# Patient Record
Sex: FEMALE | Race: WHITE | NOT HISPANIC OR LATINO | Employment: STUDENT | URBAN - METROPOLITAN AREA
[De-identification: names, ages, dates, MRNs, and addresses within clinical notes are randomized per-mention and may not be internally consistent; named-entity substitution may affect disease eponyms.]

---

## 2017-05-19 ENCOUNTER — ALLSCRIPTS OFFICE VISIT (OUTPATIENT)
Dept: OTHER | Facility: OTHER | Age: 22
End: 2017-05-19

## 2017-10-19 ENCOUNTER — ALLSCRIPTS OFFICE VISIT (OUTPATIENT)
Dept: OTHER | Facility: OTHER | Age: 22
End: 2017-10-19

## 2017-10-20 NOTE — PROGRESS NOTES
Assessment  1  Neck muscle spasm (258 36) (F09 238)    Plan  Anxiety    · ALPRAZolam 0 25 MG Oral Tablet; TAKE 1 TABLET  AS NEEDED  Neck muscle spasm    · DrRx Flexeril 10 MG #30; 1 po qhs    Discussion/Summary    1  Left neck spasm-this is most likely acute muscular in nature without any other sign of neurological problem  Would recommend trial of over-the-counter ibuprofen 600 mg 3 times daily with food as necessary  Patient will also be given a short-term prescription of Flexeril 10 mg which can be taken in the evening as necessary  Patient was cautioned of drowsiness with this medication  Anxiety/panic attacks-patient has fairly significant symptoms however they are about 2-3 weeks apart on average  We discussed treatment options and and since they are relatively infrequent I would think it is appropriate to try a Xanax 0 25 mg when necessary  We discussed appropriate side effects and use of the medication  Patient was given 20 pills which I expect the last 6 months or more  Follow up as necessary  Chief Complaint  c/o neck pain and stiffness since Sunday  taking Tylenol  History of Present Illness  HPI: This is a 53-year-old female who presents to the office with concerns over a pain in her left neck which started 4 days ago upon waking in the morning  Symptoms have been persistent since and have not been alleviated with over-the-counter acetaminophen  She is having trouble with motion in her neck with left rotation and left flexion  It is also bothering her with extension of the cervical spine  She has not had any numbness or tingling in her shoulder or arm  She still has a full range of motion with her shoulder present  She has not had any redness or swelling of the skin  She would also like to discuss anxiety today as well  She does have a history of panic attacks and gets episodes about every 2-3 weeks   She was given Paxil in the past which she tried for this but felt very funny on the medication and did not continue it  Review of Systems    Constitutional: no fever,-- not feeling poorly,-- no chills-- and-- not feeling tired  ENT: no nosebleeds-- and-- no nasal discharge  Cardiovascular: no chest pain-- and-- no palpitations  Respiratory: no shortness of breath,-- no cough,-- no wheezing-- and-- no shortness of breath during exertion  Gastrointestinal: no abdominal pain,-- no nausea-- and-- no diarrhea  Musculoskeletal: no arthralgias-- and-- no myalgias  Neurological: no headache  Active Problems  1  Acute left otitis media (382 9) (H66 92)   2  Allergic rhinitis (477 9) (J30 9)   3  Anxiety (300 00) (F41 9)   4  Birth control counseling (V25 09) (Z30 09)   5  Eczema (692 9) (L30 9)   6  Eustachian tube dysfunction (381 81) (H69 80)   7  Insomnia (780 52) (G47 00)   8  Oral contraceptive use () (Z30 41)    Past Medical History  1  History of Acute allergic serous otitis media, bilateral (381 04) (H65 113)   2  Birth control counseling (V25 09) (Z30 09)   3  History of  0 (V49 89)   4  History of acute sinusitis (V12 69) (Z87 09)   5  History of otitis media (V12 49) (Z86 69)   6  History of self breast exam   7  History of serous otitis media (V12 49) (Z86 69)   8  History of Need for influenza vaccination (V04 81) (Z23)  Active Problems And Past Medical History Reviewed: The active problems and past medical history were reviewed and updated today  Family History  Brother    1  Family history of Asthma (V17 5)  Maternal Grandmother    2  Family history of Diabetes Mellitus (V18 0)   3  Family history of Hypertension (V17 49)  Maternal Grandfather    4  Family history of Prostate Cancer (V16 42)  Paternal Grandfather    5   Family history of Diabetes Mellitus (V18 0)    Social History   · Caffeine Use   · Denied: History of Drug Use   · Denied: History of    · Marital History - Single   · Never Drank Alcohol   · Oral contraceptive use () (Z30 41)   · Baptism Affiliation [employer]   · Uses Safety Equipment - Seatbelts    Surgical History  1  History of Oral Surgery Tooth Extraction    Current Meds   1  Gildess FE 1/20 1-20 MG-MCG TABS; Therapy: 06FBO3517 to (Evaluate:21Yuu4892) Recorded    The medication list was reviewed and updated today  Allergies  1  No Known Drug Allergies    Vitals   Recorded: 35FTU8367 02:10PM   Heart Rate 88   Systolic 543   Diastolic 72   Height 5 ft 8 in   Weight 170 lb 4 oz   BMI Calculated 25 89   BSA Calculated 1 91   BMI Percentile 83 %   2-20 Stature Percentile 92 %   2-20 Weight Percentile 91 %     Physical Exam    Constitutional   General appearance: No acute distress, well appearing and well nourished  Eyes   Conjunctiva and lids: No swelling, erythema or discharge  Pupils and irises: Equal, round and reactive to light  Ears, Nose, Mouth, and Throat   External inspection of ears and nose: Normal     Otoscopic examination: Tympanic membranes translucent with normal light reflex  Canals patent without erythema  Nasal mucosa, septum, and turbinates: Normal without edema or erythema  Oropharynx: Normal with no erythema, edema, exudate or lesions  Pulmonary   Respiratory effort: No increased work of breathing or signs of respiratory distress  Auscultation of lungs: Clear to auscultation  Cardiovascular   Auscultation of heart: Normal rate and rhythm, normal S1 and S2, without murmurs  Examination of extremities for edema and/or varicosities: Normal     Abdomen   Abdomen: Non-tender, no masses  Liver and spleen: No hepatomegaly or splenomegaly  Lymphatic   Palpation of lymph nodes in neck: No lymphadenopathy  Musculoskeletal   Gait and station: Normal     Digits and nails: Normal without clubbing or cyanosis  Inspection/palpation of joints, bones, and muscles: Abnormal  -- Decreased range of motion with the cervical spine up on extension, left rotation, and left flexion   There is no midline cervical tenderness present  Full range of motion with the left shoulder is present  Skin   Skin and subcutaneous tissue: Normal without rashes or lesions  Neurologic   Cranial nerves: Cranial nerves 2-12 intact  Reflexes: 2+ and symmetric  Sensation: No sensory loss      Psychiatric   Orientation to person, place, and time: Normal     Mood and affect: Normal          Signatures   Electronically signed by : Ermelinda Blancas [facility]; Oct 19 2017  2:31PM EST                       (Author)    Electronically signed by : Dread Miller DO; Oct 19 2017  2:33PM EST                       (Author)

## 2018-01-10 NOTE — MISCELLANEOUS
Message  Return to work or school:   Leela Biswas is under my professional care   She was seen in my office on 1/19/2016     She is able to return to school on 1/19/2016          Signatures   Electronically signed by : Cristina Gutiérrez, ; Jan 19 2016  2:07PM EST                       (Author)

## 2018-01-13 NOTE — PROGRESS NOTES
Assessment    1  Eustachian tube dysfunction (381 81) (H69 80)   2  Otitis media (382 9) (H66 90)   3  Allergic rhinitis (477 9) (J30 9)   4  Oral contraceptive use (V25 41) (Z30 41)   5  Anxiety (300 00) (F41 9)    Plan  Allergic rhinitis    · Ipratropium Bromide 0 03 % Nasal Solution  Allergic rhinitis, Eustachian tube dysfunction, Otitis media    · Follow-up PRN Evaluation and Treatment  Follow-up  Status: Complete  Done:  47DSJ2914 02:03PM  Anxiety, SocHx: Oral contraceptive use    · Continue with our present treatment plan ; Status:Complete;   Done: 37ZAN5385 02:03PM    Discussion/Summary    #1  Otitis media, Z-Jovani was prescribed  #2 per allergic rhinitis, continue present therapy  #3  Eustachian tube dysfunction  Prednisone was ordered  #4  All contraceptive use  Patient instructed to second form of birth control for the entire cycle  Interaction of antibiotics her birth control pills was discussed with the patient  Patient verbalized understanding  #5 anxiety, stable continue present therapy  #6  Return to office in one week if still symptoms  Possible side effects of new medications were reviewed with the patient/guardian today  The treatment plan was reviewed with the patient/guardian  The patient/guardian understands and agrees with the treatment plan      Chief Complaint  pt c/o painful ears, sore throat, dry & productive cough, nasal congestion x 3 days  Pt taking Flonase, Advil and Mucinex D with little relief  ak      History of Present Illness  HPI: History as above  Clear sputum no fever chills night sweats  Patient feels anxiety and panic disorders stable on the new dose of Paxil      Review of Systems    Constitutional: as noted in HPI  Eyes: No complaints of eye pain, no discharge, no eyesight problems, eyes do not itch, no red or dry eyes  ENT: as noted in HPI  Cardiovascular: No complaints of chest pain, no palpitations, normal heart rate, no lower extremity edema     Respiratory: as noted in HPI  Gastrointestinal: No complaints of abdominal pain, no nausea or vomiting, no constipation, no diarrhea or bloody stools  Genitourinary: No complaints of incontinence, no pelvic pain, no dysuria or dysmenorrhea, no abnormal vaginal bleeding or vaginal discharge  Musculoskeletal: No complaints of limb swelling or limb pain, no myalgias, no joint swelling or joint stiffness  Integumentary: No complaints of skin rash, no skin lesions or wounds, no itching, no breast pain, no breast lump  Neurological: No complaints of headache, no numbness or tingling, no confusion, no dizziness, no limb weakness, no convulsions or fainting, no difficulty walking  Psychiatric: as noted in HPI  Endocrine: No complaints of feeling weak, no muscle weakness, no deepening of voice, no hot flashes or proptosis  Hematologic/Lymphatic: No complaints of swollen glands, no neck swollen glands, does not bleed or bruise easily  ROS reported by the patient  Active Problems    1  Allergic rhinitis (477 9) (J30 9)   2  Anxiety (300 00) (F41 9)   3  Birth control counseling (V2 09) (Z30 9)   4  Eczema (692 9) (L30 9)   5  History of self breast exam   6  Insomnia (780 52) (G47 00)   7  Need for influenza vaccination (V04 81) (Z23)   8  Oral contraceptive use (V25 41) (Z30 41)    Past Medical History    1  Birth control counseling ( 09) (Z30 9)   2  History of  0 (V61 8) (Z63 8)    Family History    1  Family history of Asthma (V17 5)    2  Family history of Diabetes Mellitus (V18 0)   3  Family history of Hypertension (V17 49)    4  Family history of Prostate Cancer (V16 42)    5  Family history of Diabetes Mellitus (V18 0)  Family History Reviewed: The family history was reviewed and updated today         Social History    · Caffeine Use   · Denied: History of Drug Use   · Denied: History of    · Marital History - Single   · Never A Smoker   · Never Drank Alcohol   · Oral contraceptive use (V25 41) (Z30 41)   · Restorationism Affiliation [employer]   · Uses Safety Equipment - Seatbelts  The social history was reviewed and updated today  Surgical History    1  History of Oral Surgery Tooth Extraction    Current Meds   1  Daily Value Multivitamin Oral Tablet; Take 1 tablet daily Recorded   2  Flonase 50 MCG/ACT SUSP; USE AS DIRECTED Recorded   3  Gildess FE 1/20 1-20 MG-MCG Oral Tablet; Therapy: 91HBF0442 to (Evaluate:94Apy1497) Recorded   4  Ipratropium Bromide 0 03 % Nasal Solution; USE 2 SPRAYS IN EACH NOSTRIL 3   TIMES DAILY AS NEEDED; Therapy: 23UVT6649 to (Last Rx:17Aug2015)  Requested for: 22Dws5585 Ordered   5  LORazepam 0 5 MG Oral Tablet; take 1 tablet daily as needed; Therapy: 85KQY2970 to (Last Rx:03Jun2014) Ordered   6  Montelukast Sodium 10 MG Oral Tablet; Take 1 tablet daily; Therapy: 66HAS4446 to (Last Rx:09Mar2015)  Requested for: 42TFS8766 Ordered   7  PARoxetine HCl - 30 MG Oral Tablet; TAKE 1 TABLET AT BEDTIME; Therapy: 14OPJ7217 to (Last Rx:06Jan2016)  Requested for: 61RJM7187 Ordered   8  Vitamin C 500 MG Oral Capsule; Therapy: 79LBI9291 to Recorded    Allergies    1  No Known Drug Allergies    Vitals   Recorded: 03UPI6964 01:49PM   Temperature 98 5 F   Heart Rate 96   Systolic 610   Diastolic 70   Height 5 ft 8 in   Weight 175 lb    BMI Calculated 26 61   BSA Calculated 1 94     Physical Exam    Constitutional - General appearance: No acute distress, well appearing and well nourished  Head and Face - Palpation of the face and sinuses: Normal, no sinus tenderness  Eyes - Conjunctiva and lids: No injection, edema or discharge  Ears, Nose, Mouth, and Throat - External inspection of ears and nose: Normal without deformities or discharge  Otoscopic examination: Abnormal  Both tympanic membranes are dull and injected  Nasal mucosa, septum, and turbinates: Abnormal  Positive allergic turbinates   Oropharynx: Abnormal  Clear postnasal drip minimal injection negative exudate  Neck - Neck: Supple, symmetric, no masses  Pulmonary - Auscultation of lungs: Clear bilaterally  Lymphatic - Palpation of lymph nodes in neck: Abnormal  Tender shotty anterior adenopathy  Musculoskeletal - Gait and station: Normal gait  Skin - Warm and dry  Neurologic - Negative gross focal deficit     Psychiatric - Mood and affect: Normal       Future Appointments    Date/Time Provider Specialty Site   03/09/2016 04:15 PM Aarti Mas DO [facility]     Signatures   Electronically signed by : Paul Sorensen DO; Jan 19 2016  2:04PM EST                       (Author)

## 2018-01-14 VITALS
WEIGHT: 170.25 LBS | HEART RATE: 88 BPM | DIASTOLIC BLOOD PRESSURE: 72 MMHG | HEIGHT: 68 IN | SYSTOLIC BLOOD PRESSURE: 100 MMHG | BODY MASS INDEX: 25.8 KG/M2

## 2018-01-14 VITALS
HEIGHT: 68 IN | TEMPERATURE: 98.1 F | WEIGHT: 167 LBS | HEART RATE: 100 BPM | BODY MASS INDEX: 25.31 KG/M2 | SYSTOLIC BLOOD PRESSURE: 102 MMHG | DIASTOLIC BLOOD PRESSURE: 68 MMHG

## 2018-01-16 NOTE — MISCELLANEOUS
Message  Return to work or school:   Birgit Solitario is under my professional care   She was seen in my office on 1/19/2016     She is able to return to school on 1/20/2016          Signatures   Electronically signed by : Cody Gabriel, ; Jan 19 2016  2:07PM EST                       (Author)

## 2018-11-19 RX ORDER — NORETHINDRONE ACETATE AND ETHINYL ESTRADIOL 1MG-20(21)
KIT ORAL
COMMUNITY
Start: 2015-06-04 | End: 2020-12-04 | Stop reason: ALTCHOICE

## 2018-11-19 RX ORDER — ALPRAZOLAM 0.25 MG/1
1 TABLET ORAL
COMMUNITY
Start: 2017-10-19 | End: 2020-08-17 | Stop reason: SDUPTHER

## 2018-11-20 ENCOUNTER — TELEPHONE (OUTPATIENT)
Dept: SURGERY | Facility: CLINIC | Age: 23
End: 2018-11-20

## 2018-11-20 ENCOUNTER — OFFICE VISIT (OUTPATIENT)
Dept: SURGERY | Facility: CLINIC | Age: 23
End: 2018-11-20
Payer: COMMERCIAL

## 2018-11-20 ENCOUNTER — HOSPITAL ENCOUNTER (OUTPATIENT)
Dept: ULTRASOUND IMAGING | Facility: HOSPITAL | Age: 23
Discharge: HOME | End: 2018-11-20
Payer: COMMERCIAL

## 2018-11-20 ENCOUNTER — OFFICE VISIT (OUTPATIENT)
Dept: FAMILY MEDICINE CLINIC | Facility: CLINIC | Age: 23
End: 2018-11-20
Payer: COMMERCIAL

## 2018-11-20 VITALS
HEIGHT: 68 IN | WEIGHT: 159.8 LBS | HEART RATE: 84 BPM | BODY MASS INDEX: 24.22 KG/M2 | SYSTOLIC BLOOD PRESSURE: 110 MMHG | DIASTOLIC BLOOD PRESSURE: 72 MMHG

## 2018-11-20 VITALS
HEIGHT: 68 IN | BODY MASS INDEX: 24.1 KG/M2 | DIASTOLIC BLOOD PRESSURE: 80 MMHG | SYSTOLIC BLOOD PRESSURE: 110 MMHG | HEART RATE: 84 BPM | WEIGHT: 159 LBS

## 2018-11-20 DIAGNOSIS — K62.5 RECTAL BLEED: ICD-10-CM

## 2018-11-20 DIAGNOSIS — R19.5 HEMATEST POSITIVE STOOLS: ICD-10-CM

## 2018-11-20 DIAGNOSIS — K62.5 RECTAL BLEED: Primary | ICD-10-CM

## 2018-11-20 DIAGNOSIS — K59.00 CONSTIPATION, UNSPECIFIED CONSTIPATION TYPE: ICD-10-CM

## 2018-11-20 DIAGNOSIS — R10.9 ABDOMINAL PAIN, UNSPECIFIED ABDOMINAL LOCATION: ICD-10-CM

## 2018-11-20 PROCEDURE — 99214 OFFICE O/P EST MOD 30 MIN: CPT | Performed by: FAMILY MEDICINE

## 2018-11-20 PROCEDURE — 99243 OFF/OP CNSLTJ NEW/EST LOW 30: CPT | Performed by: PHYSICIAN ASSISTANT

## 2018-11-20 PROCEDURE — 76700 US EXAM ABDOM COMPLETE: CPT

## 2018-11-20 NOTE — PROGRESS NOTES
Assessment/Plan:  1  Rectal bleed 2  Hemato test positive stool 3  Constipation 4  Abdominal pain  Blood work is ordered, ultrasound of abdomen is ordered referred to General surgery, Dr James Luna for further evaluation treatment  If rectal bleed worsens or abdominal pain worse report to [facility]          Rectal bleed  Blood work is ordered refer to Dr Obdulia Moura       Diagnoses and all orders for this visit:    Rectal bleed  -     CBC; Future  -     Comprehensive metabolic panel; Future  -     TSH, 3rd generation with Free T4 reflex; Future  -     Urinalysis with reflex to microscopic; Future  -     Pregnancy Test (HCG Qualitative); Future  -     US abdomen complete; Future  -     Ambulatory referral to General Surgery; Future    Hematest positive stools  -     CBC; Future  -     Comprehensive metabolic panel; Future  -     TSH, 3rd generation with Free T4 reflex; Future  -     Urinalysis with reflex to microscopic; Future  -     Pregnancy Test (HCG Qualitative); Future  -     US abdomen complete; Future  -     Ambulatory referral to General Surgery; Future    Abdominal pain, unspecified abdominal location  -     CBC; Future  -     Comprehensive metabolic panel; Future  -     TSH, 3rd generation with Free T4 reflex; Future  -     Urinalysis with reflex to microscopic; Future  -     Pregnancy Test (HCG Qualitative); Future  -     US abdomen complete; Future  -     Ambulatory referral to General Surgery; Future    Constipation, unspecified constipation type  -     CBC; Future  -     Comprehensive metabolic panel; Future  -     TSH, 3rd generation with Free T4 reflex; Future  -     Urinalysis with reflex to microscopic; Future  -     Pregnancy Test (HCG Qualitative); Future  -     US abdomen complete; Future  -     Ambulatory referral to General Surgery; Future          Subjective: Pt c/o bright red blood with bowel movement   She states she has had some red blood when wiping but this colored the toilet water  She has a family history of Colon polyps  kw     Patient ID: Charlotte Calderon is a 23 y o  female  For the past couple days patient has bright red blood doing her bowel movements patient does feel some mild abdominal pain and constipation  Patient denies any melena change in stool  The following portions of the patient's history were reviewed and updated as appropriate: allergies, current medications, past family history, past medical history, past social history, past surgical history and problem list     Review of Systems   Constitutional: Negative  HENT: Negative  Eyes: Negative  Respiratory: Negative  Cardiovascular: Negative  Gastrointestinal:        HPI   Endocrine: Negative  Genitourinary: Negative  Musculoskeletal: Negative  Skin: Negative  Allergic/Immunologic: Negative  Neurological: Negative  Hematological: Negative  Psychiatric/Behavioral: Negative  Objective:      /72 (BP Location: Left arm)   Pulse 84   Ht 5' 8" (1 727 m)   Wt 72 5 kg (159 lb 12 8 oz)   BMI 24 30 kg/m²          Physical Exam   Constitutional: She is oriented to person, place, and time  She appears well-developed and well-nourished  HENT:   Head: Normocephalic and atraumatic  Mouth/Throat: Oropharynx is clear and moist    Eyes: Pupils are equal, round, and reactive to light  Conjunctivae and EOM are normal  No scleral icterus  Neck: Neck supple  No thyromegaly present  Cardiovascular: Normal rate, regular rhythm and normal heart sounds  Pulmonary/Chest: Effort normal and breath sounds normal    Abdominal: Soft  Bowel sounds are normal  She exhibits no distension and no mass  There is no tenderness  There is no rebound and no guarding  Genitourinary: Rectal exam shows guaiac positive stool     Genitourinary Comments: Rectal exam shows no mass or abnormalities stool was brown but hemato test positive   Musculoskeletal: She exhibits no edema  Lymphadenopathy:     She has no cervical adenopathy  Neurological: She is alert and oriented to person, place, and time  Skin: Skin is warm and dry  Psychiatric: She has a normal mood and affect

## 2018-11-20 NOTE — PATIENT INSTRUCTIONS
Complete blood work complete ultrasound patient follow up with Dr Marito Jimenez, surgery    If abdominal pain worsens rectal bleed worsens patient report to [facility] [place] [facility]

## 2018-11-20 NOTE — PROGRESS NOTES
Assessment/Plan:   Charlotte Calderon is a 23 y  o female who is here for The encounter diagnosis was Rectal bleed  Patient with left lower quadrant abdominal pain for 3-4 days  Denies nausea or vomiting  Patient with bright red blood with bowel movement yesterday and clots in water  Patient admits to intermittent blood on toilet paper over the past year  Denies any rectal pain  Patient admits to loose stools 5 times a day  Family history of colon polyps  Plan:  Plan for colonoscopy         Preoperative Clearance: None        - Patient has been instructed to avoid aspirin containing medications or non-steroidal anti-inflammatory drugs for the week preceding surgery  ______________________________________________________  CC:Consult (blood in stool)    HPI:  Charlotte Calderon is a 23 y  o female who was referred for evaluation of Consult (blood in stool)    Currently bright red blood per rectum and in bowel movements x1 day  Associated with left lower quadrant abdominal pain  Patient admits to intermittent blood on toilet paper over the past year  Patient denies any change in stool caliber or consistency  Patient moves her bowels about 4-5 times daily  Denies fevers, chills, shortness of breath, chest pain or palpitation  Family history of colon polyps      ROS:  General ROS: negative  negative for - chills, fatigue, fever or night sweats, weight loss  Respiratory ROS: no cough, shortness of breath, or wheezing  Cardiovascular ROS: no chest pain or dyspnea on exertion  Genito-Urinary ROS: no dysuria, trouble voiding, or hematuria  Musculoskeletal ROS: negative for - gait disturbance, joint pain or muscle pain  Neurological ROS: no TIA or stroke symptoms  Gastrointestinal: see HPI  No abdominal pain, melena, BRB unless specified in HPI  Skin ROS: no new rashes or lesions   Lymphatic ROS: no new adenopathy noted by pt     GYN ROS: see HPI, no new GYN history or bleeding noted  Psy ROS: no new mental or behavioral disturbances       Patient Active Problem List   Diagnosis    Allergic rhinitis    Anxiety    Eczema    Eustachian tube dysfunction    Insomnia    Rectal bleed         Allergies:  Metronidazole      Current Outpatient Prescriptions:     ALPRAZolam (XANAX) 0 25 mg tablet, Take 1 tablet by mouth, Disp: , Rfl:     norethindrone-ethinyl estradiol (GILDESS FE 1/20) 1-20 MG-MCG per tablet, Take by mouth, Disp: , Rfl:     No past medical history on file  Past Surgical History:   Procedure Laterality Date    TOOTH EXTRACTION         Family History   Problem Relation Age of Onset    Asthma Brother     Diabetes Maternal Grandmother     Hypertension Maternal Grandmother     Prostate cancer Maternal Grandfather     Diabetes Paternal Grandfather         reports that she has never smoked  She has never used smokeless tobacco  She reports that she does not drink alcohol or use drugs  Labs:   Lab Results   Component Value Date    WBC 5 19 11/25/2014    HGB 13 4 11/25/2014    HCT 41 7 11/25/2014    MCV 93 11/25/2014     11/25/2014     Lab Results   Component Value Date     11/25/2014    K 3 9 11/25/2014     11/25/2014    CO2 29 11/25/2014    ANIONGAP 6 11/25/2014    BUN 12 11/25/2014    CREATININE 0 63 11/25/2014    GLUCOSE 79 11/25/2014    CALCIUM 9 5 11/25/2014    AST 12 11/25/2014    ALT 12 11/25/2014    ALKPHOS 95 11/25/2014    PROT 7 7 11/25/2014    BILITOT 0 4 11/25/2014         Imaging: No new pertinent imaging studies           PHYSICAL EXAM        PHYSICAL EXAM  General Appearance:    Alert, cooperative, no distress, healthy   Head:    Normocephalic without obvious abnormality   Eyes:    PERRL, conjunctiva/corneas clear, EOM's intact        Neck:   Supple, no adenopathy, no JVD   Back:     Symmetric, no spinal or CVA tenderness   Lungs:     Clear to auscultation bilaterally, no wheezing or rhonchi   Heart:    Regular rate and rhythm, S1 and S2 normal, no murmur   Abdomen: Soft, mild LLQ tenderness to deep palpitation  + BS  Rectum without external hemorrhoids   Extremities:   Extremities normal  No clubbing, cyanosis or edema   Psych:   Normal Affect   Neurologic:   CNII-XII intact  Strength symmetric, speech intact                                           Some portions of this record may have been generated with voice recognition software  There may be translation, syntax,  or grammatical errors  Occasional wrong word or "sound-a-like" substitutions may have occurred due to the inherent limitations of the voice recognition software  Read the chart carefully and recognize, using context, where substitutions may have occurred  If you have any questions, please contact the dictating provider for clarification or correction, as needed  This encounter has been coded by a non-certified coder         Kelly Torres PA-C    Date: 11/20/2018 Time: 10:41 AM

## 2018-11-20 NOTE — LETTER
November 20, 2018     DO Ruba Adhikari [address]  [address] [place]    Patient: Lorrie Busby   YOB: 1995   Date of Visit: 11/20/2018       Dear Dr Denise Box: Thank you for referring Lorrie Busby to me for evaluation  Below are my notes for this consultation  If you have questions, please do not hesitate to call me  I look forward to following your patient along with you  Sincerely,        Perfecto Milton PA-C        CC: No Recipients  Gabriel Sofia  11/20/2018 10:45 AM  Sign at close encounter  Assessment/Plan:   Lorrie Busby is a 23 y  o female who is here for The encounter diagnosis was Rectal bleed  Patient with left lower quadrant abdominal pain for 3-4 days  Denies nausea or vomiting  Patient with bright red blood with bowel movement yesterday and clots in water  Patient admits to intermittent blood on toilet paper over the past year  Denies any rectal pain  Patient admits to loose stools 5 times a day  Family history of colon polyps  Plan:  Plan for colonoscopy         Preoperative Clearance: None        - Patient has been instructed to avoid aspirin containing medications or non-steroidal anti-inflammatory drugs for the week preceding surgery  ______________________________________________________  CC:Consult (blood in stool)    HPI:  Lorrie Busby is a 23 y  o female who was referred for evaluation of Consult (blood in stool)    Currently bright red blood per rectum and in bowel movements x1 day  Associated with left lower quadrant abdominal pain  Patient admits to intermittent blood on toilet paper over the past year  Patient denies any change in stool caliber or consistency  Patient moves her bowels about 4-5 times daily  Denies fevers, chills, shortness of breath, chest pain or palpitation      Family history of colon polyps      ROS:  General ROS: negative  negative for - chills, fatigue, fever or night sweats, weight loss  Respiratory ROS: no cough, shortness of breath, or wheezing  Cardiovascular ROS: no chest pain or dyspnea on exertion  Genito-Urinary ROS: no dysuria, trouble voiding, or hematuria  Musculoskeletal ROS: negative for - gait disturbance, joint pain or muscle pain  Neurological ROS: no TIA or stroke symptoms  Gastrointestinal: see HPI  No abdominal pain, melena, BRB unless specified in HPI  Skin ROS: no new rashes or lesions   Lymphatic ROS: no new adenopathy noted by pt  GYN ROS: see HPI, no new GYN history or bleeding noted  Psy ROS: no new mental or behavioral disturbances       Patient Active Problem List   Diagnosis    Allergic rhinitis    Anxiety    Eczema    Eustachian tube dysfunction    Insomnia    Rectal bleed         Allergies:  Metronidazole      Current Outpatient Prescriptions:     ALPRAZolam (XANAX) 0 25 mg tablet, Take 1 tablet by mouth, Disp: , Rfl:     norethindrone-ethinyl estradiol (GILDESS FE 1/20) 1-20 MG-MCG per tablet, Take by mouth, Disp: , Rfl:     No past medical history on file  Past Surgical History:   Procedure Laterality Date    TOOTH EXTRACTION         Family History   Problem Relation Age of Onset    Asthma Brother     Diabetes Maternal Grandmother     Hypertension Maternal Grandmother     Prostate cancer Maternal Grandfather     Diabetes Paternal Grandfather         reports that she has never smoked  She has never used smokeless tobacco  She reports that she does not drink alcohol or use drugs      Labs:   Lab Results   Component Value Date    WBC 5 19 11/25/2014    HGB 13 4 11/25/2014    HCT 41 7 11/25/2014    MCV 93 11/25/2014     11/25/2014     Lab Results   Component Value Date     11/25/2014    K 3 9 11/25/2014     11/25/2014    CO2 29 11/25/2014    ANIONGAP 6 11/25/2014    BUN 12 11/25/2014    CREATININE 0 63 11/25/2014    GLUCOSE 79 11/25/2014    CALCIUM 9 5 11/25/2014    AST 12 11/25/2014    ALT 12 11/25/2014    ALKPHOS 95 11/25/2014 PROT 7 7 11/25/2014    BILITOT 0 4 11/25/2014         Imaging: No new pertinent imaging studies  PHYSICAL EXAM        PHYSICAL EXAM  General Appearance:    Alert, cooperative, no distress, healthy   Head:    Normocephalic without obvious abnormality   Eyes:    PERRL, conjunctiva/corneas clear, EOM's intact        Neck:   Supple, no adenopathy, no JVD   Back:     Symmetric, no spinal or CVA tenderness   Lungs:     Clear to auscultation bilaterally, no wheezing or rhonchi   Heart:    Regular rate and rhythm, S1 and S2 normal, no murmur   Abdomen:     Soft, mild LLQ tenderness to deep palpitation  + BS  Rectum without external hemorrhoids   Extremities:   Extremities normal  No clubbing, cyanosis or edema   Psych:   Normal Affect   Neurologic:   CNII-XII intact  Strength symmetric, speech intact                                           Some portions of this record may have been generated with voice recognition software  There may be translation, syntax,  or grammatical errors  Occasional wrong word or "sound-a-like" substitutions may have occurred due to the inherent limitations of the voice recognition software  Read the chart carefully and recognize, using context, where substitutions may have occurred  If you have any questions, please contact the dictating provider for clarification or correction, as needed  This encounter has been coded by a non-certified coder         Maximilian Elaine PA-C    Date: 11/20/2018 Time: 10:41 AM

## 2018-11-20 NOTE — TELEPHONE ENCOUNTER
Patient is here for her same day appointment  Can you please generate [facility] referral? (none available in [place])    Thank you     Any questions/issues, please call [phone]

## 2018-11-21 PROBLEM — K62.5 RECTAL BLEEDING: Status: ACTIVE | Noted: 2018-11-21

## 2018-11-21 LAB
ALBUMIN SERPL-MCNC: 4.5 G/DL (ref 3.6–5.1)
ALBUMIN/GLOB SERPL: 2 (CALC) (ref 1–2.5)
ALP SERPL-CCNC: 55 U/L (ref 33–115)
ALT SERPL-CCNC: 6 U/L (ref 6–29)
APPEARANCE UR: ABNORMAL
AST SERPL-CCNC: 11 U/L (ref 10–30)
B-HCG SERPL QL: NEGATIVE
BASOPHILS # BLD AUTO: 41 CELLS/UL (ref 0–200)
BASOPHILS NFR BLD AUTO: 0.7 %
BILIRUB SERPL-MCNC: 0.5 MG/DL (ref 0.2–1.2)
BILIRUB UR QL STRIP: NEGATIVE
BUN SERPL-MCNC: 17 MG/DL (ref 7–25)
BUN/CREAT SERPL: NORMAL (CALC) (ref 6–22)
CALCIUM SERPL-MCNC: 9.5 MG/DL (ref 8.6–10.2)
CHLORIDE SERPL-SCNC: 108 MMOL/L (ref 98–110)
CO2 SERPL-SCNC: 28 MMOL/L (ref 20–32)
COLOR UR: YELLOW
CREAT SERPL-MCNC: 0.82 MG/DL (ref 0.5–1.1)
EOSINOPHIL # BLD AUTO: 110 CELLS/UL (ref 15–500)
EOSINOPHIL NFR BLD AUTO: 1.9 %
ERYTHROCYTE [DISTWIDTH] IN BLOOD BY AUTOMATED COUNT: 11.9 % (ref 11–15)
GLOBULIN SER CALC-MCNC: 2.2 G/DL (CALC) (ref 1.9–3.7)
GLUCOSE SERPL-MCNC: 85 MG/DL (ref 65–99)
GLUCOSE UR QL STRIP: NEGATIVE
HCT VFR BLD AUTO: 39.8 % (ref 35–45)
HGB BLD-MCNC: 13.5 G/DL (ref 11.7–15.5)
HGB UR QL STRIP: NEGATIVE
KETONES UR QL STRIP: NEGATIVE
LEUKOCYTE ESTERASE UR QL STRIP: NEGATIVE
LYMPHOCYTES # BLD AUTO: 1531 CELLS/UL (ref 850–3900)
LYMPHOCYTES NFR BLD AUTO: 26.4 %
MCH RBC QN AUTO: 31 PG (ref 27–33)
MCHC RBC AUTO-ENTMCNC: 33.9 G/DL (ref 32–36)
MCV RBC AUTO: 91.5 FL (ref 80–100)
MONOCYTES # BLD AUTO: 423 CELLS/UL (ref 200–950)
MONOCYTES NFR BLD AUTO: 7.3 %
NEUTROPHILS # BLD AUTO: 3695 CELLS/UL (ref 1500–7800)
NEUTROPHILS NFR BLD AUTO: 63.7 %
NITRITE UR QL STRIP: NEGATIVE
PH UR STRIP: ABNORMAL [PH] (ref 5–8)
PLATELET # BLD AUTO: 250 THOUSAND/UL (ref 140–400)
PMV BLD REES-ECKER: 11.1 FL (ref 7.5–12.5)
POTASSIUM SERPL-SCNC: 4.1 MMOL/L (ref 3.5–5.3)
PROT SERPL-MCNC: 6.7 G/DL (ref 6.1–8.1)
PROT UR QL STRIP: NEGATIVE
RBC # BLD AUTO: 4.35 MILLION/UL (ref 3.8–5.1)
SL AMB EGFR AFRICAN AMERICAN: 119 ML/MIN/1.73M2
SL AMB EGFR NON AFRICAN AMERICAN: 103 ML/MIN/1.73M2
SODIUM SERPL-SCNC: 143 MMOL/L (ref 135–146)
SP GR UR STRIP: 1.03 (ref 1–1.03)
TSH SERPL-ACNC: 1.24 MIU/L
WBC # BLD AUTO: 5.8 THOUSAND/UL (ref 3.8–10.8)

## 2018-11-26 ENCOUNTER — OFFICE VISIT (OUTPATIENT)
Dept: FAMILY MEDICINE CLINIC | Facility: CLINIC | Age: 23
End: 2018-11-26
Payer: COMMERCIAL

## 2018-11-26 VITALS
HEART RATE: 72 BPM | BODY MASS INDEX: 23.7 KG/M2 | WEIGHT: 160 LBS | TEMPERATURE: 98.3 F | HEIGHT: 69 IN | DIASTOLIC BLOOD PRESSURE: 60 MMHG | SYSTOLIC BLOOD PRESSURE: 122 MMHG

## 2018-11-26 DIAGNOSIS — H65.91 OME (OTITIS MEDIA WITH EFFUSION), RIGHT: Primary | ICD-10-CM

## 2018-11-26 PROCEDURE — 3008F BODY MASS INDEX DOCD: CPT | Performed by: PHYSICIAN ASSISTANT

## 2018-11-26 PROCEDURE — 99214 OFFICE O/P EST MOD 30 MIN: CPT | Performed by: PHYSICIAN ASSISTANT

## 2018-11-26 RX ORDER — SULFAMETHOXAZOLE AND TRIMETHOPRIM 800; 160 MG/1; MG/1
1 TABLET ORAL EVERY 12 HOURS SCHEDULED
Qty: 20 TABLET | Refills: 0 | Status: SHIPPED | OUTPATIENT
Start: 2018-11-26 | End: 2018-12-06

## 2018-11-26 NOTE — PROGRESS NOTES
Assessment/Plan:    OME (otitis media with effusion), right  Antibiotic as directed  May use Mucinex D or generic Sudefed  Increase fluids  Diagnoses and all orders for this visit:    OME (otitis media with effusion), right  -     antipyrine-benzocaine (AURODEX) otic solution; Administer 4 drops to the right ear every 2 (two) hours as needed for ear pain  -     sulfamethoxazole-trimethoprim (BACTRIM DS) 800-160 mg per tablet; Take 1 tablet by mouth every 12 (twelve) hours for 10 days          Subjective:   CC: c/o congestion and right ear pain  kck     Patient ID: Rene Bobby is a 23 y o  female  Four days ago started with head congestion and developed right ear pain  Left ear pain feels okay patient has used Claritin without D without relief  Having pain sharp shooting intermittent right ear  History of ear infections  No sick contacts  No fevers  No nausea vomiting or diarrhea  The following portions of the patient's history were reviewed and updated as appropriate: allergies, current medications, past family history, past medical history, past social history, past surgical history and problem list     Review of Systems   Constitutional: Negative  HENT: Positive for congestion and ear pain  Eyes: Negative  Respiratory: Negative  Cardiovascular: Negative  Gastrointestinal: Negative  Endocrine: Negative  Genitourinary: Negative  Musculoskeletal: Negative  Skin: Negative  Allergic/Immunologic: Negative  Neurological: Negative  Hematological: Negative  Psychiatric/Behavioral: Negative  Objective:      Vitals:    11/26/18 1054   BP: 122/60   BP Location: Left arm   Patient Position: Sitting   Pulse: 72   Temp: 98 3 °F (36 8 °C)   TempSrc: Oral   Weight: 72 6 kg (160 lb)   Height: 5' 9" (1 753 m)            Physical Exam   Constitutional: She is oriented to person, place, and time  She appears well-developed and well-nourished  No distress  HENT:   Head: Normocephalic and atraumatic  Right Ear: Ear canal normal  There is swelling and tenderness  Tympanic membrane is erythematous and bulging  Decreased hearing is noted  Left Ear: Hearing, tympanic membrane, external ear and ear canal normal    Nose: Nose normal    Mouth/Throat: Posterior oropharyngeal edema and posterior oropharyngeal erythema present  Eyes: Conjunctivae are normal  Right eye exhibits no discharge  Left eye exhibits no discharge  Neck: Neck supple  Carotid bruit is not present  Cardiovascular: Normal rate, regular rhythm and normal heart sounds  Exam reveals no gallop and no friction rub  No murmur heard  Pulmonary/Chest: Effort normal and breath sounds normal  No respiratory distress  She has no wheezes  She has no rales  Lymphadenopathy:     She has cervical adenopathy  Right cervical: Superficial cervical adenopathy present  Neurological: She is alert and oriented to person, place, and time  Skin: Skin is warm and dry  She is not diaphoretic  Psychiatric: She has a normal mood and affect  Judgment normal    Nursing note and vitals reviewed

## 2018-11-26 NOTE — PATIENT INSTRUCTIONS
Problem List Items Addressed This Visit        Nervous and Auditory    OME (otitis media with effusion), right - Primary     Antibiotic as directed  May use Mucinex D or generic Sudefed  Increase fluids            Relevant Medications    antipyrine-benzocaine (AURODEX) otic solution    sulfamethoxazole-trimethoprim (BACTRIM DS) 800-160 mg per tablet

## 2018-12-03 ENCOUNTER — ANESTHESIA EVENT (OUTPATIENT)
Dept: GASTROENTEROLOGY | Facility: HOSPITAL | Age: 23
End: 2018-12-03
Payer: COMMERCIAL

## 2018-12-04 ENCOUNTER — HOSPITAL ENCOUNTER (OUTPATIENT)
Facility: HOSPITAL | Age: 23
Setting detail: OUTPATIENT SURGERY
Discharge: HOME | End: 2018-12-04
Attending: SURGERY | Admitting: SURGERY
Payer: COMMERCIAL

## 2018-12-04 ENCOUNTER — ANESTHESIA (OUTPATIENT)
Dept: GASTROENTEROLOGY | Facility: HOSPITAL | Age: 23
End: 2018-12-04
Payer: COMMERCIAL

## 2018-12-04 VITALS
RESPIRATION RATE: 14 BRPM | TEMPERATURE: 98.9 F | HEIGHT: 69 IN | DIASTOLIC BLOOD PRESSURE: 70 MMHG | BODY MASS INDEX: 23.55 KG/M2 | HEART RATE: 76 BPM | WEIGHT: 159 LBS | OXYGEN SATURATION: 100 % | SYSTOLIC BLOOD PRESSURE: 107 MMHG

## 2018-12-04 DIAGNOSIS — K62.5 RECTAL BLEEDING: ICD-10-CM

## 2018-12-04 LAB — EXT PREGNANCY TEST URINE: NEGATIVE

## 2018-12-04 PROCEDURE — 45380 COLONOSCOPY AND BIOPSY: CPT | Performed by: SURGERY

## 2018-12-04 PROCEDURE — 81025 URINE PREGNANCY TEST: CPT | Performed by: ANESTHESIOLOGY

## 2018-12-04 PROCEDURE — 88305 TISSUE EXAM BY PATHOLOGIST: CPT | Performed by: PATHOLOGY

## 2018-12-04 RX ORDER — PROPOFOL 10 MG/ML
INJECTION, EMULSION INTRAVENOUS AS NEEDED
Status: DISCONTINUED | OUTPATIENT
Start: 2018-12-04 | End: 2018-12-04 | Stop reason: SURG

## 2018-12-04 RX ORDER — SODIUM CHLORIDE 9 MG/ML
125 INJECTION, SOLUTION INTRAVENOUS CONTINUOUS
Status: DISCONTINUED | OUTPATIENT
Start: 2018-12-04 | End: 2018-12-04 | Stop reason: HOSPADM

## 2018-12-04 RX ADMIN — PROPOFOL 50 MG: 10 INJECTION, EMULSION INTRAVENOUS at 08:46

## 2018-12-04 RX ADMIN — PROPOFOL 50 MG: 10 INJECTION, EMULSION INTRAVENOUS at 08:38

## 2018-12-04 RX ADMIN — PROPOFOL 150 MG: 10 INJECTION, EMULSION INTRAVENOUS at 08:35

## 2018-12-04 RX ADMIN — SODIUM CHLORIDE 125 ML/HR: 0.9 INJECTION, SOLUTION INTRAVENOUS at 07:41

## 2018-12-04 RX ADMIN — PROPOFOL 50 MG: 10 INJECTION, EMULSION INTRAVENOUS at 08:40

## 2018-12-04 NOTE — OP NOTE
COLONOSCOPY  Postoperative Note  PATIENT NAME: Nereyda Villatoro  : 1995  MRN: [id number]  [facility] ROOM 01    Surgery Date: 2018    Pre operative diagnosis:  Rectal bleeding [K62 5]    Operative Indications:  Due for Colonoscopy :    Previous Colonoscopy if any, and  Location of polyps if any:   none          Consent:  The risks, benefits, and alternatives to the surgery were discussed with the patient and with the family prior to surgery if available, personally by Dr David Man  If the consent was obtained by the physician assistant or other representative, the consent was reviewed once again personally by the operating physician  Common complications particular for this procedure as well as unusual complications were discussed, including but not limited to:  bleeding, wound infection, prolonged wound healing, open wounds, reoperation, leak from the bowel or viscus, leak from the bile duct or injury to adjacent or other organs or blood vessels in the abdomen, and possible surgery or reoperation  If the surgery was laparoscopic, it was discussed that possible open surgery could also occur during that same surgery and is always an option in laparoscopic surgery  A  was used if necessary  The patient expressed understanding of the issues discussed and wished and consented to the procedure to proceed  All questions were answered  Dr David Man personally discussed the informed consent with this patient  Post-Operative Diagnosis and Findings:   Hemorrhoids       Procedure:   Procedure(s):  COLONOSCOPY            Surgeon(s) and Role:     * Ila Thomas MD - Primary  I was present for the entire procedure  Specimens:  ID Type Source Tests Collected by Time Destination   1 : 3mm colon polyp transverse colon Tissue Large Intestine, Transverse Colon TISSUE EXAM Ila Thomas MD 2018 [id number]        Estimated Blood Loss:   Minimal    Anesthesia Type:   Choice     Procedure:    The patient was seen in the Holding Room  The risks, benefits, complications, treatment options, and expected outcomes were discussed with the patient  The possibilities of reaction to medication, pulmonary aspiration, perforation of viscus, bleeding, recurrent infection, the need for additional procedures, failure to diagnose a condition, and creating a complication requiring transfusion or operation were discussed with the patient  The patient concurred with the proposed plan, giving informed consent  The patient was taken to Endoscopy Suite, identified as [facility]  Staff confirmed patient name, , and procedure  A Time Out was held and the above information confirmed  A visual and digital exam was carried out of the anus and anal canal   Findings were normal unless specified below  The colonoscope was passed per anus and traversed to the cecum  The cecum was clearly visualized in the right lower quadrant by light reflex and palpation  The scope was withdrawn  There were no mucosal lesions or polyps seen throughout the colon  There were diverticula scattered throughout the sigmoid colon and grade 1 and 2 hemorrhoids  A photograph was taken  The scope was retroflexed  There were no anorectal lesions other than the hemorrhoids  The scope was removed  The patient tolerated the procedure well  Withdrawal time was greater than 10 minutes  Prep was good  at a 4/5  Some portions of this record may have been generated with voice recognition software  There may be translation, syntax,  or grammatical errors  Occasional wrong word or "sound-a-like" substitutions may have occurred due to the inherent limitations of the voice recognition software  Read the chart carefully and recognize, using context, where substations may have occurred  If you have any questions, please contact the dictating provider for clarification or correction, as needed       Complications: None    EBL: < 0 5 or none cc    Condition: Stable to PACU      Follow up endoscopy: Follow-up colonoscopy timing is difficult to predict without pathology and is not an exact science  Patients are told to follow up earlier than recommended if they have any symptoms or GI changes  However it is required that we predict possible endoscopy follow-up at the time of this procedure  Based on clinical appearance, follow-up colonoscopy is estimated to be recommended  in:  Routine screening recommendations        SIGNATURE: Monalisa Garay MD   DATE: December 4, 2018   TIME: 8:50 AM

## 2018-12-04 NOTE — ANESTHESIA PREPROCEDURE EVALUATION
Review of Systems/Medical History  Patient summary reviewed    No history of anesthetic complications     Cardiovascular  Negative cardio ROS Exercise tolerance (METS): >4,     Pulmonary  Negative pulmonary ROS        GI/Hepatic    Bowel prep  Comment: Rectal bleeding     Negative  ROS        Endo/Other  Negative endo/other ROS      GYN  Negative gynecology ROS          Hematology  Negative hematology ROS      Musculoskeletal  Negative musculoskeletal ROS        Neurology  Negative neurology ROS      Psychology   Anxiety,              Physical Exam    Airway    Mallampati score: I  TM Distance: >3 FB  Neck ROM: full     Dental   No notable dental hx     Cardiovascular  Comment: Negative ROS, Rhythm: regular, Rate: normal,     Pulmonary  Breath sounds clear to auscultation,     Other Findings        Anesthesia Plan  ASA Score- 2     Anesthesia Type- IV sedation with anesthesia with ASA Monitors  Additional Monitors:   Airway Plan:     Comment: Currently has a right ear infection on antibiotics - symptoms resolving  No fevers, or congestion  Plan Factors-Patient not instructed to abstain from smoking on day of procedure  Patient did not smoke on day of surgery  Induction- intravenous  Postoperative Plan-     Informed Consent- Anesthetic plan and risks discussed with patient  I personally reviewed this patient with the CRNA  Discussed and agreed on the Anesthesia Plan with the CRNA  Naila Walsh

## 2018-12-04 NOTE — PROGRESS NOTES
D/C instructions given to pt and mother who verbalize understanding  OOB to ambulate, gait steady denies dizziness

## 2018-12-04 NOTE — DISCHARGE SUMMARY
Discharge Summary - Harriet Nieves 23 y o  female MRN: [id number]    Unit/Bed#: [facility] [place] Encounter: [id number]      Pre-Operative Diagnosis: Pre-Op Diagnosis Codes:     * Rectal bleeding [K62 5]    Post-Operative Diagnosis: Post-Op Diagnosis Codes:     * Rectal bleeding [K62 5]     * Hemorrhoids [455]    Procedures Performed:  Procedure(s):  COLONOSCOPY with polypectomy    Surgeon: Irena Mehta MD    See H & P for full details of admission and Operative Note for full details of operations performed  Patient was seen and examined prior to discharge  Provisions for Follow-Up Care:  See After Visit Summary for information related to follow-up care and home orders  Disposition: Home, in stable condition  Planned Readmission: No    Discharge Medications:  See after visit summary for reconciled discharge medications provided to patient and family  Post Operative instructions: Reviewed with patient and/or family  Some portions of this record may have been generated with voice recognition software  There may be translation, syntax,  or grammatical errors  Occasional wrong word or "sound-a-like" substitutions may have occurred due to the inherent limitations of the voice recognition software  Read the chart carefully and recognize, using context, where substitutions may have occurred  If you have any questions, please contact the dictating provider for clarification or correction, as needed  This encounter has been coded by non certified Coder      Signature:   Irena Mehta MD  Date: 12/4/2018 Time: 8:51 AM

## 2018-12-04 NOTE — H&P (VIEW-ONLY)
Assessment/Plan:   Carlyn Sandy is a 23 y  o female who is here for The encounter diagnosis was Rectal bleed  Patient with left lower quadrant abdominal pain for 3-4 days  Denies nausea or vomiting  Patient with bright red blood with bowel movement yesterday and clots in water  Patient admits to intermittent blood on toilet paper over the past year  Denies any rectal pain  Patient admits to loose stools 5 times a day  Family history of colon polyps  Plan:  Plan for colonoscopy         Preoperative Clearance: None        - Patient has been instructed to avoid aspirin containing medications or non-steroidal anti-inflammatory drugs for the week preceding surgery  ______________________________________________________  CC:Consult (blood in stool)    HPI:  Carlyn Sandy is a 23 y  o female who was referred for evaluation of Consult (blood in stool)    Currently bright red blood per rectum and in bowel movements x1 day  Associated with left lower quadrant abdominal pain  Patient admits to intermittent blood on toilet paper over the past year  Patient denies any change in stool caliber or consistency  Patient moves her bowels about 4-5 times daily  Denies fevers, chills, shortness of breath, chest pain or palpitation  Family history of colon polyps      ROS:  General ROS: negative  negative for - chills, fatigue, fever or night sweats, weight loss  Respiratory ROS: no cough, shortness of breath, or wheezing  Cardiovascular ROS: no chest pain or dyspnea on exertion  Genito-Urinary ROS: no dysuria, trouble voiding, or hematuria  Musculoskeletal ROS: negative for - gait disturbance, joint pain or muscle pain  Neurological ROS: no TIA or stroke symptoms  Gastrointestinal: see HPI  No abdominal pain, melena, BRB unless specified in HPI  Skin ROS: no new rashes or lesions   Lymphatic ROS: no new adenopathy noted by pt     GYN ROS: see HPI, no new GYN history or bleeding noted  Psy ROS: no new mental or behavioral disturbances       Patient Active Problem List   Diagnosis    Allergic rhinitis    Anxiety    Eczema    Eustachian tube dysfunction    Insomnia    Rectal bleed         Allergies:  Metronidazole      Current Outpatient Prescriptions:     ALPRAZolam (XANAX) 0 25 mg tablet, Take 1 tablet by mouth, Disp: , Rfl:     norethindrone-ethinyl estradiol (GILDESS FE 1/20) 1-20 MG-MCG per tablet, Take by mouth, Disp: , Rfl:     No past medical history on file  Past Surgical History:   Procedure Laterality Date    TOOTH EXTRACTION         Family History   Problem Relation Age of Onset    Asthma Brother     Diabetes Maternal Grandmother     Hypertension Maternal Grandmother     Prostate cancer Maternal Grandfather     Diabetes Paternal Grandfather         reports that she has never smoked  She has never used smokeless tobacco  She reports that she does not drink alcohol or use drugs  Labs:   Lab Results   Component Value Date    WBC 5 19 11/25/2014    HGB 13 4 11/25/2014    HCT 41 7 11/25/2014    MCV 93 11/25/2014     11/25/2014     Lab Results   Component Value Date     11/25/2014    K 3 9 11/25/2014     11/25/2014    CO2 29 11/25/2014    ANIONGAP 6 11/25/2014    BUN 12 11/25/2014    CREATININE 0 63 11/25/2014    GLUCOSE 79 11/25/2014    CALCIUM 9 5 11/25/2014    AST 12 11/25/2014    ALT 12 11/25/2014    ALKPHOS 95 11/25/2014    PROT 7 7 11/25/2014    BILITOT 0 4 11/25/2014         Imaging: No new pertinent imaging studies           PHYSICAL EXAM        PHYSICAL EXAM  General Appearance:    Alert, cooperative, no distress, healthy   Head:    Normocephalic without obvious abnormality   Eyes:    PERRL, conjunctiva/corneas clear, EOM's intact        Neck:   Supple, no adenopathy, no JVD   Back:     Symmetric, no spinal or CVA tenderness   Lungs:     Clear to auscultation bilaterally, no wheezing or rhonchi   Heart:    Regular rate and rhythm, S1 and S2 normal, no murmur   Abdomen: Soft, mild LLQ tenderness to deep palpitation  + BS  Rectum without external hemorrhoids   Extremities:   Extremities normal  No clubbing, cyanosis or edema   Psych:   Normal Affect   Neurologic:   CNII-XII intact  Strength symmetric, speech intact                                           Some portions of this record may have been generated with voice recognition software  There may be translation, syntax,  or grammatical errors  Occasional wrong word or "sound-a-like" substitutions may have occurred due to the inherent limitations of the voice recognition software  Read the chart carefully and recognize, using context, where substitutions may have occurred  If you have any questions, please contact the dictating provider for clarification or correction, as needed  This encounter has been coded by a non-certified coder         Maximilian Elaine PA-C    Date: 11/20/2018 Time: 10:41 AM

## 2018-12-04 NOTE — DISCHARGE INSTRUCTIONS
[facility]  Endoscopy Post-Operative Instructions  Dr Gabrielle Tomlin MD, FACS    Procedure: Colonoscopy    Findings:  Hemorrhoids    Follow-Up: You will need a repeat Endoscopy in (generally) about age 36-53 unless symptoms recur  Upper endoscopy would be indicated if you had continued bleeding, to rule out ulcer but this is unlikely  You did have a small amount of bleeding, at the end of the procedure, likely due to your hemorrhoids  Nothing needs to be done for them at this time except continue a healthy high-fiber diet  You should have an endoscopy sooner than recommended if you have any symptoms of bleeding or change in stools or other concerns  You will receive a call from our office with your results, in addition to the the preliminary results you received today  You will usually receive a follow-up letter from our office in 1-2 weeks  Call the office if you do not hear from us  You are welcome to also schedule an office visit if desired to discuss the results further  It is your responsibility to contact our office for results in 1- 2 weeks if you do not hear from us  If a follow up endoscopy is needed, you are responsible for arranging that follow up appointment at the appropriate time  The office may or may not issue a reminder at that future time  Please take responsibility for your own follow up healthcare  Diet: Eat a light snack first, and then resume your previous diet  Discharge Medications:  See after visit summary for reconciled discharge medications provided to patient and family  Current Facility-Administered Medications:     sodium chloride 0 9 % infusion, 125 mL/hr, Intravenous, Continuous, Ramona Uribe DO, Last Rate: 125 mL/hr at 12/04/18 0741, 125 mL/hr at 12/04/18 0741  Do not take aspirin or blood thinning medications for 2 days following procedure  Tylenol is okay  You may have been given a new medication   Please take this (usually an anti-ulcer -type medication) and see your primary care doctor for refills and follow up medications if needed       After the test: Notify physician for arm swelling or pain at the intravenous site  You may have some abdominal discomfort following the procedure  Belching or passing flatus will help relieve it  Following upper endoscopy, you may experience a temporary sore throat  Saline gargles or lozenges can be taken to relieve sore throat Following lower endoscopy, minor rectal bleeding is not uncommon      Activity: Do not drive a car, operate machinery, or sign legal documents for 24 hours after your procedure  Normal activity may be resumed on the day following the procedure      Call the office at [phone] for any of the following: Severe abdominal pain, significant rectal bleeding, chills, or fever above 100°, new onset of persistent cough or persistent vomiting      [address], [address] [facility]  Phone: [phone]            Hemorrhoids   WHAT YOU NEED TO KNOW:   Hemorrhoids are swollen blood vessels inside your rectum (internal hemorrhoids) or on your anus (external hemorrhoids)  Sometimes a hemorrhoid may prolapse  This means it extends out of your anus  DISCHARGE INSTRUCTIONS:   Seek care immediately if:   · You have severe pain in your rectum or around your anus  · You have severe pain in your abdomen and you are vomiting  · You have bleeding from your anus that soaks through your underwear  Contact your healthcare provider if:   · You have frequent and painful bowel movements  · Your hemorrhoid looks or feels more swollen than usual      · You do not have a bowel movement for 2 days or more  · You see or feel tissue coming through your anus  · You have questions or concerns about your condition or care  Medicines:   You may  need any of the following:  · Medicine  may be given to decrease pain, swelling, and itching  The medicine may come as a pad, cream, or ointment  · Stool softeners  help treat or prevent constipation  · NSAIDs , such as ibuprofen, help decrease swelling, pain, and fever  NSAIDs can cause stomach bleeding or kidney problems in certain people  If you take blood thinner medicine, always ask your healthcare provider if NSAIDs are safe for you  Always read the medicine label and follow directions  · Take your medicine as directed  Contact your healthcare provider if you think your medicine is not helping or if you have side effects  Tell him or her if you are allergic to any medicine  Keep a list of the medicines, vitamins, and herbs you take  Include the amounts, and when and why you take them  Bring the list or the pill bottles to follow-up visits  Carry your medicine list with you in case of an emergency  Manage your symptoms:   · Apply ice on your anus for 15 to 20 minutes every hour or as directed  Use an ice pack, or put crushed ice in a plastic bag  Cover it with a towel before you apply it to your anus  Ice helps prevent tissue damage and decreases swelling and pain  · Take a sitz bath  Fill a bathtub with 4 to 6 inches of warm water  You may also use a sitz bath pan that fits inside a toilet bowl  Sit in the sitz bath for 15 minutes  Do this 3 times a day, and after each bowel movement  The warm water can help decrease pain and swelling  · Keep your anal area clean  Gently wash the area with warm water daily  Soap may irritate the area  After a bowel movement, wipe with moist towelettes or wet toilet paper  Dry toilet paper can irritate the area  Prevent hemorrhoids:   · Do not strain to have a bowel movement  Do not sit on the toilet too long  These actions can increase pressure on the tissues in your rectum and anus  · Drink plenty of liquids  Liquids can help prevent constipation   Ask how much liquid to drink each day and which liquids are best for you      · Eat a variety of high-fiber foods  Examples include fruits, vegetables, and whole grains  Ask your healthcare provider how much fiber you need each day  You may need to take a fiber supplement  · Exercise as directed  Exercise, such as walking, may make it easier to have a bowel movement  Ask your healthcare provider to help you create an exercise plan  · Do not have anal sex  Anal sex can weaken the skin around your rectum and anus  · Avoid heavy lifting  This can cause straining and increase your risk for another hemorrhoid  Follow up with your healthcare provider as directed:  Write down your questions so you remember to ask them during your visits  © 2017 [address] [employer]  Information is for End User's use only and may not be sold, redistributed or otherwise used for commercial purposes  All illustrations and images included in CareNotes® are the copyrighted property of [employer] D [employer] M [employer]  or [employer]  The above information is an  only  It is not intended as medical advice for individual conditions or treatments  Talk to your doctor, nurse or pharmacist before following any medical regimen to see if it is safe and effective for you  Colonoscopy   WHAT YOU NEED TO KNOW:   A colonoscopy is a procedure to examine the inside of your colon (intestine) with a scope  Polyps or tissue growths may have been removed during your colonoscopy  It is normal to feel bloated and to have some abdominal discomfort  You should be passing gas  If you have hemorrhoids or you had polyps removed, you may have a small amount of bleeding  DISCHARGE INSTRUCTIONS:   Seek care immediately if:   · You have a large amount of bright red blood in your bowel movements  · Your abdomen is hard and firm and you have severe pain  · You have sudden trouble breathing  Contact your healthcare provider if:   · You develop a rash or hives      · You have a fever within 24 hours of your procedure  · You have not had a bowel movement for 3 days after your procedure  · You have questions or concerns about your condition or care  Activity:   · Do not lift, strain, or run  for 3 days after your procedure  · Rest after your procedure  You have been given medicine to relax you  Do not  drive or make important decisions until the day after your procedure  Return to your normal activity as directed  · Relieve gas and discomfort from bloating  by lying on your right side with a heating pad on your abdomen  You may need to take short walks to help the gas move out  Eat small meals until bloating is relieved  If you had polyps removed: For 7 days after your procedure:  · Do not  take aspirin  · Do not  go on long car rides  Help prevent constipation:   · Eat a variety of healthy foods  Healthy foods include fruit, vegetables, whole-grain breads, low-fat dairy products, beans, lean meat, and fish  Ask if you need to be on a special diet  Your healthcare provider may recommend that you eat high-fiber foods such as cooked beans  Fiber helps you have regular bowel movements  · Drink liquids as directed  Adults should drink between 9 and 13 eight-ounce cups of liquid every day  Ask what amount is best for you  For most people, good liquids to drink are water, juice, and milk  · Exercise as directed  Talk to your healthcare provider about the best exercise plan for you  Exercise can help prevent constipation, decrease your blood pressure and improve your health  Follow up with your healthcare provider as directed:  Write down your questions so you remember to ask them during your visits  © 2017 [address] Information is for End User's use only and may not be sold, redistributed or otherwise used for commercial purposes   All illustrations and images included in CareNotes® are the copyrighted property of A D A M , Inc  or [employer]  The above information is an  only  It is not intended as medical advice for individual conditions or treatments  Talk to your doctor, nurse or pharmacist before following any medical regimen to see if it is safe and effective for you

## 2018-12-05 ENCOUNTER — TELEPHONE (OUTPATIENT)
Dept: SURGERY | Facility: CLINIC | Age: 23
End: 2018-12-05

## 2018-12-05 NOTE — TELEPHONE ENCOUNTER
Called patient post colonoscopy 12/4/18  Patient states she is feeling well  No F/C/N/V  States she has been able to have a BM  Aware that pathology has been sent out and that she will receive a call with results  Patient aware that she will receive a letter in the mail with recommended follow up  Will call office with questions or concerns  Path pending

## 2019-03-20 ENCOUNTER — OFFICE VISIT (OUTPATIENT)
Dept: FAMILY MEDICINE CLINIC | Facility: CLINIC | Age: 24
End: 2019-03-20
Payer: COMMERCIAL

## 2019-03-20 VITALS
HEIGHT: 69 IN | DIASTOLIC BLOOD PRESSURE: 60 MMHG | SYSTOLIC BLOOD PRESSURE: 104 MMHG | TEMPERATURE: 98.6 F | HEART RATE: 80 BPM | BODY MASS INDEX: 23.61 KG/M2 | WEIGHT: 159.4 LBS

## 2019-03-20 DIAGNOSIS — H66.006 RECURRENT ACUTE SUPPURATIVE OTITIS MEDIA WITHOUT SPONTANEOUS RUPTURE OF TYMPANIC MEMBRANE OF BOTH SIDES: Primary | ICD-10-CM

## 2019-03-20 PROBLEM — H66.003 ACUTE SUPPURATIVE OTITIS MEDIA OF BOTH EARS WITHOUT SPONTANEOUS RUPTURE OF TYMPANIC MEMBRANES: Status: ACTIVE | Noted: 2019-03-20

## 2019-03-20 PROBLEM — H65.91 OME (OTITIS MEDIA WITH EFFUSION), RIGHT: Status: RESOLVED | Noted: 2018-11-26 | Resolved: 2019-03-20

## 2019-03-20 PROCEDURE — 1036F TOBACCO NON-USER: CPT | Performed by: PHYSICIAN ASSISTANT

## 2019-03-20 PROCEDURE — 3008F BODY MASS INDEX DOCD: CPT | Performed by: PHYSICIAN ASSISTANT

## 2019-03-20 PROCEDURE — 99214 OFFICE O/P EST MOD 30 MIN: CPT | Performed by: PHYSICIAN ASSISTANT

## 2019-03-20 RX ORDER — CEFUROXIME AXETIL 250 MG/1
250 TABLET ORAL EVERY 12 HOURS SCHEDULED
Qty: 20 TABLET | Refills: 0 | Status: SHIPPED | OUTPATIENT
Start: 2019-03-20 | End: 2019-03-30

## 2019-03-20 NOTE — PROGRESS NOTES
Assessment and Plan:    Problem List Items Addressed This Visit        Nervous and Auditory    Acute suppurative otitis media of both ears without spontaneous rupture of tympanic membranes - Primary     Antibiotic as directed  Increase fluids  Continue flonase  May consider adding Mucinex D generic as directed  Relevant Medications    cefuroxime (CEFTIN) 250 mg tablet                 Diagnoses and all orders for this visit:    Recurrent acute suppurative otitis media without spontaneous rupture of tympanic membrane of both sides  -     cefuroxime (CEFTIN) 250 mg tablet; Take 1 tablet (250 mg total) by mouth every 12 (twelve) hours for 10 days              Subjective:      Patient ID: Deidre Wadsworth is a 26 y o  female  CC:    Chief Complaint   Patient presents with    Earache     x several days     Cough     dry throat       HPI:    Pt  here today with c/o 3 days of head congestion, sore throat, bilateral ear pain and popping  Has been using flonase, claritin w/o D  Her boyfriend has bronchitis  No fever, N/V/D  States thast since the last OV with ear infection she has been getting blood out of her R ear canal on occasion  No pain or decrease in hearing  The following portions of the patient's history were reviewed and updated as appropriate: allergies, current medications, past family history, past medical history, past social history, past surgical history and problem list       Review of Systems   Constitutional: Negative  HENT: Positive for congestion, ear pain, postnasal drip, rhinorrhea and sore throat  Eyes: Negative  Respiratory: Positive for cough  Cardiovascular: Negative  Gastrointestinal: Negative  Endocrine: Negative  Genitourinary: Negative  Musculoskeletal: Negative  Skin: Negative  Allergic/Immunologic: Negative  Neurological: Negative  Hematological: Negative  Psychiatric/Behavioral: Negative            Data to review: Objective:    Vitals:    03/20/19 1320   BP: 104/60   BP Location: Left arm   Patient Position: Sitting   Pulse: 80   Temp: 98 6 °F (37 °C)   TempSrc: Oral   Weight: 72 3 kg (159 lb 6 4 oz)   Height: 5' 9" (1 753 m)        Physical Exam   Constitutional: She is oriented to person, place, and time  She appears well-developed and well-nourished  No distress  HENT:   Head: Normocephalic and atraumatic  Right Ear: Hearing, external ear and ear canal normal  Tympanic membrane is erythematous  Tympanic membrane is not perforated  Left Ear: Hearing, external ear and ear canal normal  Tympanic membrane is erythematous  Tympanic membrane is not perforated  Nose: Mucosal edema and rhinorrhea present  Mouth/Throat: Posterior oropharyngeal edema and posterior oropharyngeal erythema present  No oropharyngeal exudate  Eyes: Conjunctivae are normal  Right eye exhibits no discharge  Left eye exhibits no discharge  Neck: Neck supple  Carotid bruit is not present  Cardiovascular: Normal rate, regular rhythm and normal heart sounds  Exam reveals no gallop and no friction rub  No murmur heard  Pulmonary/Chest: Effort normal and breath sounds normal  No respiratory distress  She has no wheezes  She has no rales  Lymphadenopathy:     She has cervical adenopathy  Right cervical: Superficial cervical adenopathy present  Left cervical: Superficial cervical adenopathy present  Neurological: She is alert and oriented to person, place, and time  Skin: Skin is warm and dry  She is not diaphoretic  Psychiatric: She has a normal mood and affect  Judgment normal    Nursing note and vitals reviewed

## 2019-03-20 NOTE — PATIENT INSTRUCTIONS
Problem List Items Addressed This Visit        Nervous and Auditory    Acute suppurative otitis media of both ears without spontaneous rupture of tympanic membranes - Primary     Antibiotic as directed  Increase fluids  Continue flonase  May consider adding Mucinex D generic as directed

## 2019-03-26 DIAGNOSIS — H66.003 ACUTE SUPPURATIVE OTITIS MEDIA OF BOTH EARS WITHOUT SPONTANEOUS RUPTURE OF TYMPANIC MEMBRANES, RECURRENCE NOT SPECIFIED: Primary | ICD-10-CM

## 2019-03-26 RX ORDER — SULFAMETHOXAZOLE AND TRIMETHOPRIM 800; 160 MG/1; MG/1
1 TABLET ORAL EVERY 12 HOURS SCHEDULED
Qty: 20 TABLET | Refills: 0 | Status: SHIPPED | OUTPATIENT
Start: 2019-03-26 | End: 2019-04-05

## 2019-03-26 NOTE — TELEPHONE ENCOUNTER
Pt  Called in that the abx she was started on last week for an ear infection is giving her nausea, vomiting and diarrhea and is wondering if this medication can be changed or what she can take to help with these symptoms  Pt  Is not sure the medication is helping with the ear infection either as he symptoms have not changed at all and still has pressure and fullness in ears

## 2019-09-30 ENCOUNTER — OFFICE VISIT (OUTPATIENT)
Dept: FAMILY MEDICINE CLINIC | Facility: CLINIC | Age: 24
End: 2019-09-30
Payer: COMMERCIAL

## 2019-09-30 VITALS
DIASTOLIC BLOOD PRESSURE: 60 MMHG | HEART RATE: 80 BPM | SYSTOLIC BLOOD PRESSURE: 100 MMHG | TEMPERATURE: 98.5 F | WEIGHT: 150 LBS | BODY MASS INDEX: 22.22 KG/M2 | HEIGHT: 69 IN

## 2019-09-30 DIAGNOSIS — N39.0 URINARY TRACT INFECTION WITHOUT HEMATURIA, SITE UNSPECIFIED: Primary | ICD-10-CM

## 2019-09-30 DIAGNOSIS — R30.0 DYSURIA: ICD-10-CM

## 2019-09-30 DIAGNOSIS — B96.89 BACTERIAL VAGINOSIS: ICD-10-CM

## 2019-09-30 DIAGNOSIS — N76.0 BACTERIAL VAGINOSIS: ICD-10-CM

## 2019-09-30 LAB
SL AMB  POCT GLUCOSE, UA: NORMAL
SL AMB LEUKOCYTE ESTERASE,UA: NORMAL
SL AMB POCT BILIRUBIN,UA: NORMAL
SL AMB POCT BLOOD,UA: NORMAL
SL AMB POCT CLARITY,UA: NORMAL
SL AMB POCT COLOR,UA: YELLOW
SL AMB POCT KETONES,UA: NORMAL
SL AMB POCT NITRITE,UA: NORMAL
SL AMB POCT PH,UA: 5.5
SL AMB POCT SPECIFIC GRAVITY,UA: 1.02
SL AMB POCT URINE PROTEIN: >=300
SL AMB POCT UROBILINOGEN: 0.2

## 2019-09-30 PROCEDURE — 87186 SC STD MICRODIL/AGAR DIL: CPT | Performed by: PHYSICIAN ASSISTANT

## 2019-09-30 PROCEDURE — 87086 URINE CULTURE/COLONY COUNT: CPT | Performed by: PHYSICIAN ASSISTANT

## 2019-09-30 PROCEDURE — 87077 CULTURE AEROBIC IDENTIFY: CPT | Performed by: PHYSICIAN ASSISTANT

## 2019-09-30 PROCEDURE — 99214 OFFICE O/P EST MOD 30 MIN: CPT | Performed by: PHYSICIAN ASSISTANT

## 2019-09-30 PROCEDURE — 3008F BODY MASS INDEX DOCD: CPT | Performed by: PHYSICIAN ASSISTANT

## 2019-09-30 PROCEDURE — 81002 URINALYSIS NONAUTO W/O SCOPE: CPT | Performed by: PHYSICIAN ASSISTANT

## 2019-09-30 RX ORDER — CIPROFLOXACIN 500 MG/1
500 TABLET, FILM COATED ORAL EVERY 12 HOURS SCHEDULED
Qty: 14 TABLET | Refills: 0 | Status: SHIPPED | OUTPATIENT
Start: 2019-09-30 | End: 2019-10-07

## 2019-09-30 NOTE — PROGRESS NOTES
Assessment and Plan:  Patient Instructions     1  Urinary tract infection -recommend starting Cipro 500 mg twice daily for 7 days  Urinalysis will be sent for culture and sensitivity  2  Bacterial vaginosis -continue metronidazole per Gynecology  3  Allergic rhinitis -stable, no medication changes  Diagnoses and all orders for this visit:    Urinary tract infection without hematuria, site unspecified  -     Urine culture; Future  -     ciprofloxacin (CIPRO) 500 mg tablet; Take 1 tablet (500 mg total) by mouth every 12 (twelve) hours for 7 days  -     Urine culture    Dysuria  -     POCT urine dip  -     ciprofloxacin (CIPRO) 500 mg tablet; Take 1 tablet (500 mg total) by mouth every 12 (twelve) hours for 7 days    Bacterial vaginosis              Subjective:      Patient ID: Geovanna Sanchez is a 26 y o  female  CC:    Chief Complaint   Patient presents with    Difficulty Urinating     pt complaining of discomfort with urinatition since thursday~cd       HPI:     HPI:  This is a 28-year-old female who presents to the office with concerns over a possible urinary tract infection  She states that she has never had an infection before  Since Thursday of last week she started experiencing some discomfort upon urination  She also has had a bit more frequency of urination since then  She does have a general sense of discomfort in her lower flank area on either side  She was recently treated for bacterial vaginosis by her gynecologist   She is just starting day 1 of metronidazole  The following portions of the patient's history were reviewed and updated as appropriate: allergies, current medications, past family history, past medical history, past social history, past surgical history and problem list       Review of Systems   Constitutional: Negative for chills, fatigue and fever  HENT: Negative for congestion, ear pain and sinus pressure  Eyes: Negative for visual disturbance     Respiratory: Negative for cough, chest tightness and shortness of breath  Cardiovascular: Negative for chest pain and palpitations  Gastrointestinal: Negative for diarrhea, nausea and vomiting  Endocrine: Negative for polyuria  Genitourinary: Positive for dysuria and frequency  Musculoskeletal: Negative for arthralgias and myalgias  Skin: Negative for pallor and rash  Neurological: Negative for dizziness, weakness, light-headedness, numbness and headaches  Psychiatric/Behavioral: Negative for agitation, behavioral problems and sleep disturbance  All other systems reviewed and are negative  Data to review:       Objective:    Vitals:    09/30/19 1429   BP: 100/60   BP Location: Left arm   Patient Position: Sitting   Cuff Size: Standard   Pulse: 80   Temp: 98 5 °F (36 9 °C)   TempSrc: Tympanic   Weight: 68 kg (150 lb)   Height: 5' 9" (1 753 m)        Physical Exam   Constitutional: She is oriented to person, place, and time  She appears well-developed and well-nourished  No distress  HENT:   Head: Normocephalic and atraumatic  Eyes: Pupils are equal, round, and reactive to light  Conjunctivae are normal    Cardiovascular: Normal rate, normal heart sounds and intact distal pulses  No murmur heard  Pulmonary/Chest: Effort normal  No respiratory distress  She has no wheezes  Abdominal: She exhibits no distension and no mass  There is no tenderness  There is no guarding  Musculoskeletal: Normal range of motion  Neurological: She is alert and oriented to person, place, and time

## 2019-09-30 NOTE — PATIENT INSTRUCTIONS
1  Urinary tract infection -recommend starting Cipro 500 mg twice daily for 7 days  Urinalysis will be sent for culture and sensitivity  2  Bacterial vaginosis -continue metronidazole per Gynecology  3  Allergic rhinitis -stable, no medication changes

## 2019-10-02 LAB — BACTERIA UR CULT: ABNORMAL

## 2019-11-15 ENCOUNTER — OFFICE VISIT (OUTPATIENT)
Dept: FAMILY MEDICINE CLINIC | Facility: CLINIC | Age: 24
End: 2019-11-15
Payer: COMMERCIAL

## 2019-11-15 VITALS
TEMPERATURE: 98.1 F | SYSTOLIC BLOOD PRESSURE: 102 MMHG | WEIGHT: 151 LBS | DIASTOLIC BLOOD PRESSURE: 60 MMHG | HEIGHT: 69 IN | BODY MASS INDEX: 22.36 KG/M2 | HEART RATE: 76 BPM

## 2019-11-15 DIAGNOSIS — H69.81 DYSFUNCTION OF RIGHT EUSTACHIAN TUBE: ICD-10-CM

## 2019-11-15 DIAGNOSIS — H66.91 RIGHT OTITIS MEDIA, UNSPECIFIED OTITIS MEDIA TYPE: Primary | ICD-10-CM

## 2019-11-15 DIAGNOSIS — Z30.41 ORAL CONTRACEPTIVE USE: ICD-10-CM

## 2019-11-15 PROCEDURE — 1036F TOBACCO NON-USER: CPT | Performed by: PHYSICIAN ASSISTANT

## 2019-11-15 PROCEDURE — 99214 OFFICE O/P EST MOD 30 MIN: CPT | Performed by: PHYSICIAN ASSISTANT

## 2019-11-15 RX ORDER — CEFUROXIME AXETIL 500 MG/1
500 TABLET ORAL EVERY 12 HOURS SCHEDULED
Qty: 20 TABLET | Refills: 0 | Status: SHIPPED | OUTPATIENT
Start: 2019-11-15 | End: 2019-11-25

## 2019-11-15 NOTE — PROGRESS NOTES
Assessment and Plan:  Patient Instructions   1  Right acute otitis media-recommend starting Ceftin 500 mg twice daily for 10 days with food  Patient may also use over-the-counter Mucinex and Flonase as necessary  She may continue naproxen as needed for pain  Follow-up in the next week if symptoms are not improving or sooner if symptoms would worsen  2  Oral contraceptive use-patient was cautioned that antibiotic may reduce the effectiveness of this for the remainder of her cycle  Patient verbalizes understanding and agreement  3   Eustachian tube dysfunction right ear-recommend using Flonase nasal spray as directed for this  Diagnoses and all orders for this visit:    Right otitis media, unspecified otitis media type  -     cefuroxime (CEFTIN) 500 mg tablet; Take 1 tablet (500 mg total) by mouth every 12 (twelve) hours for 10 days    Oral contraceptive use    Dysfunction of right eustachian tube              Subjective:      Patient ID: Viridiana Guerin is a 26 y o  female  CC:    Chief Complaint   Patient presents with    Cold Like Symptoms    Earache    Nasal Congestion     pt states symptoms began 3 days ago~cd       HPI:    HPI:  This is a 28-year-old female who presents to the office with concerns over sore throat and ear pain that started about 3 days ago  She was around somebody with bronchitis recently  She has been coughing but has been mostly dry  She has a lot of drainage from the sinuses  She has been using some over-the-counter Benadryl and naproxen for the pain in the ear  She has had a history of recurrent ear infections in the past   She has not had any discharge from the ear        The following portions of the patient's history were reviewed and updated as appropriate: allergies, current medications, past family history, past medical history, past social history, past surgical history and problem list       Review of Systems   Constitutional: Negative for chills and fever    HENT: Positive for congestion, ear pain, sinus pressure and sore throat  Respiratory: Negative for chest tightness and shortness of breath  Cardiovascular: Negative for chest pain and palpitations  Gastrointestinal: Negative for abdominal pain, diarrhea and vomiting  Skin: Negative for rash  Neurological: Negative for dizziness  Data to review:       Objective:    Vitals:    11/15/19 1025   BP: 102/60   BP Location: Left arm   Patient Position: Sitting   Cuff Size: Standard   Pulse: 76   Temp: 98 1 °F (36 7 °C)   TempSrc: Tympanic   Weight: 68 5 kg (151 lb)   Height: 5' 9" (1 753 m)        Physical Exam   Constitutional: She is oriented to person, place, and time  She appears well-developed and well-nourished  No distress  HENT:   Head: Normocephalic and atraumatic  Turbinates are erythematous and edematous bilaterally  Right tympanic membrane is erythematous and dull  Left tympanic membrane is clear with positive cone of light  Eyes: Pupils are equal, round, and reactive to light  Conjunctivae are normal    Neck: Normal range of motion  No tracheal deviation present  No thyromegaly present  Cardiovascular: Normal rate, normal heart sounds and intact distal pulses  No murmur heard  Pulmonary/Chest: Effort normal  No respiratory distress  She has no wheezes  Lymphadenopathy:     She has cervical adenopathy  Neurological: She is alert and oriented to person, place, and time  Nursing note and vitals reviewed

## 2019-11-15 NOTE — PATIENT INSTRUCTIONS
1   Right acute otitis media-recommend starting Ceftin 500 mg twice daily for 10 days with food  Patient may also use over-the-counter Mucinex and Flonase as necessary  She may continue naproxen as needed for pain  Follow-up in the next week if symptoms are not improving or sooner if symptoms would worsen  2  Oral contraceptive use-patient was cautioned that antibiotic may reduce the effectiveness of this for the remainder of her cycle  Patient verbalizes understanding and agreement  3   Eustachian tube dysfunction right ear-recommend using Flonase nasal spray as directed for this

## 2019-12-27 ENCOUNTER — TRANSCRIBE ORDERS (OUTPATIENT)
Dept: FAMILY MEDICINE CLINIC | Facility: CLINIC | Age: 24
End: 2019-12-27

## 2019-12-27 DIAGNOSIS — R10.9 UNSPECIFIED ABDOMINAL PAIN: Primary | ICD-10-CM

## 2019-12-27 DIAGNOSIS — K62.5 HEMORRHAGE OF ANUS AND RECTUM: ICD-10-CM

## 2020-08-17 ENCOUNTER — OFFICE VISIT (OUTPATIENT)
Dept: FAMILY MEDICINE CLINIC | Facility: CLINIC | Age: 25
End: 2020-08-17
Payer: COMMERCIAL

## 2020-08-17 VITALS
DIASTOLIC BLOOD PRESSURE: 60 MMHG | BODY MASS INDEX: 21.48 KG/M2 | RESPIRATION RATE: 18 BRPM | WEIGHT: 145 LBS | HEART RATE: 80 BPM | SYSTOLIC BLOOD PRESSURE: 108 MMHG | TEMPERATURE: 98.7 F | HEIGHT: 69 IN

## 2020-08-17 DIAGNOSIS — F41.9 ANXIETY: Primary | ICD-10-CM

## 2020-08-17 DIAGNOSIS — J02.0 PHARYNGITIS DUE TO STREPTOCOCCUS SPECIES: ICD-10-CM

## 2020-08-17 DIAGNOSIS — H60.333 ACUTE SWIMMER'S EAR OF BOTH SIDES: ICD-10-CM

## 2020-08-17 PROBLEM — H66.003 ACUTE SUPPURATIVE OTITIS MEDIA OF BOTH EARS WITHOUT SPONTANEOUS RUPTURE OF TYMPANIC MEMBRANES: Status: RESOLVED | Noted: 2019-03-20 | Resolved: 2020-08-17

## 2020-08-17 PROCEDURE — 3008F BODY MASS INDEX DOCD: CPT | Performed by: FAMILY MEDICINE

## 2020-08-17 PROCEDURE — 1036F TOBACCO NON-USER: CPT | Performed by: FAMILY MEDICINE

## 2020-08-17 PROCEDURE — 99214 OFFICE O/P EST MOD 30 MIN: CPT | Performed by: FAMILY MEDICINE

## 2020-08-17 RX ORDER — AZITHROMYCIN 250 MG/1
TABLET, FILM COATED ORAL
Qty: 6 TABLET | Refills: 0 | Status: SHIPPED | OUTPATIENT
Start: 2020-08-17 | End: 2020-08-21

## 2020-08-17 RX ORDER — ALPRAZOLAM 0.25 MG/1
0.25 TABLET ORAL AS NEEDED
Qty: 30 TABLET | Refills: 0 | Status: SHIPPED | OUTPATIENT
Start: 2020-08-17

## 2020-08-21 ENCOUNTER — TELEPHONE (OUTPATIENT)
Dept: FAMILY MEDICINE CLINIC | Facility: CLINIC | Age: 25
End: 2020-08-21

## 2020-08-21 ENCOUNTER — HOSPITAL ENCOUNTER (EMERGENCY)
Facility: HOSPITAL | Age: 25
Discharge: HOME | End: 2020-08-21
Attending: EMERGENCY MEDICINE | Admitting: EMERGENCY MEDICINE
Payer: COMMERCIAL

## 2020-08-21 VITALS
RESPIRATION RATE: 18 BRPM | DIASTOLIC BLOOD PRESSURE: 78 MMHG | OXYGEN SATURATION: 99 % | HEIGHT: 70 IN | SYSTOLIC BLOOD PRESSURE: 128 MMHG | BODY MASS INDEX: 20.76 KG/M2 | HEART RATE: 80 BPM | TEMPERATURE: 98.2 F | WEIGHT: 145 LBS

## 2020-08-21 DIAGNOSIS — H66.90 OTITIS MEDIA: Primary | ICD-10-CM

## 2020-08-21 PROCEDURE — 99284 EMERGENCY DEPT VISIT MOD MDM: CPT | Performed by: PHYSICIAN ASSISTANT

## 2020-08-21 PROCEDURE — 99283 EMERGENCY DEPT VISIT LOW MDM: CPT

## 2020-08-21 RX ORDER — FLUCONAZOLE 200 MG/1
200 TABLET ORAL DAILY
Qty: 1 TABLET | Refills: 0 | Status: SHIPPED | OUTPATIENT
Start: 2020-08-21 | End: 2020-08-22

## 2020-08-21 RX ORDER — AMOXICILLIN AND CLAVULANATE POTASSIUM 875; 125 MG/1; MG/1
1 TABLET, FILM COATED ORAL EVERY 12 HOURS
Qty: 14 TABLET | Refills: 0 | Status: SHIPPED | OUTPATIENT
Start: 2020-08-21 | End: 2020-08-28

## 2020-08-21 NOTE — TELEPHONE ENCOUNTER
PT CALLED IN AND STATED SHE IS STILL HAVING PAIN IN HER EAR AND HER PAIN WAS WORSE LAST NIGHT  THE MEDICATION SHE WAS GIVEN AT HER OV 8/17 ISN'T HELPING HER  SHE WOULD LIKE TO KNOW WHAT ELSE SHE CAN DO? THERE IS NO APPOINTMENTS AVAILABLE FOR TODAY  PLEASE GIVE PT A CALL BACK WITH RECOMMENDATIONS  THANK YOU!

## 2020-08-21 NOTE — ED PROVIDER NOTES
History  Chief Complaint   Patient presents with    Earache     right sided, treated for swimmer's ear  today felt a pop and had some bloody drainage with increased pain  53-year-old female presents emergency room with concern for earache on the right ear  She states she was recently treated for swimmer's ear with antibiotic drops and was also given a Z-Jovani for strep throat  She states that her throat feels better however she has worsening ear pain  She states she also noticed bloody drainage from the right ear over the last day or so  She thought she felt a pop and has had ringing in the ear as well  She denies any other complaints and is well-appearing in no acute distress at rest     Allergies reviewed          Prior to Admission Medications   Prescriptions Last Dose Informant Patient Reported? Taking? ALPRAZolam (XANAX) 0 25 mg tablet   No No   Sig: Take 1 tablet (0 25 mg total) by mouth as needed for anxiety   azithromycin (ZITHROMAX) 250 mg tablet   No No   Si 1st day, 1/d for 4 days   neomycin-polymyxin-hydrocortisone (CORTISPORIN) otic solution   No No   Sig: Administer 3 drops into both ears every 6 (six) hours for 5 days   norethindrone-ethinyl estradiol (GILDESS FE ) 1-20 MG-MCG per tablet  Self Yes No   Sig: Take by mouth      Facility-Administered Medications: None       Past Medical History:   Diagnosis Date    Rectal bleed     happening for about a year       Past Surgical History:   Procedure Laterality Date    VA COLONOSCOPY FLX DX W/COLLJ SPEC WHEN PFRMD N/A 2018    Procedure: COLONOSCOPY with polypectomy;  Surgeon: Mare Brittle, MD;  Location: [facility];   Service: General    TOOTH EXTRACTION         Family History   Problem Relation Age of Onset    Asthma Brother     Diabetes Maternal Grandmother     Hypertension Maternal Grandmother     Prostate cancer Maternal Grandfather     Diabetes Paternal Grandfather      I have reviewed and agree with the history as documented  E-Cigarette/Vaping     E-Cigarette/Vaping Substances     Social History     Tobacco Use    Smoking status: Current Every Day Smoker     Packs/day: 1 00    Smokeless tobacco: Never Used   Substance Use Topics    Alcohol use: Yes     Comment: social    Drug use: No       Review of Systems   Constitutional: Negative for chills, fatigue and fever  HENT: Positive for ear pain  Negative for congestion, rhinorrhea, sinus pressure, sneezing, sore throat and trouble swallowing  Eyes: Negative for discharge and itching  Respiratory: Negative for cough, chest tightness, shortness of breath, wheezing and stridor  Cardiovascular: Negative for chest pain and palpitations  Gastrointestinal: Negative for abdominal pain, diarrhea, nausea and vomiting  Neurological: Negative for dizziness, syncope, numbness and headaches  All other systems reviewed and are negative  Physical Exam  Physical Exam  Vitals signs and nursing note reviewed  Constitutional:       General: She is not in acute distress  Appearance: She is well-developed  She is not diaphoretic  HENT:      Head: Normocephalic and atraumatic  Right Ear: External ear normal  Drainage present  Tympanic membrane is scarred, erythematous and bulging  Left Ear: Hearing, tympanic membrane, ear canal and external ear normal       Ears:      Comments: Small amount of dried blood noted in the ear canal   Bulging injected tympanic membrane on the right  Nose: Nose normal    Eyes:      Conjunctiva/sclera: Conjunctivae normal       Pupils: Pupils are equal, round, and reactive to light  Neck:      Musculoskeletal: Normal range of motion  Cardiovascular:      Rate and Rhythm: Normal rate and regular rhythm  Heart sounds: Normal heart sounds  No murmur  No friction rub  No gallop  Pulmonary:      Effort: Pulmonary effort is normal  No respiratory distress  Breath sounds: Normal breath sounds  No stridor   No wheezing or rales  Abdominal:      General: Bowel sounds are normal  There is no distension  Palpations: Abdomen is soft  Tenderness: There is no abdominal tenderness  There is no guarding  Musculoskeletal: Normal range of motion  General: No tenderness  Skin:     General: Skin is warm  Capillary Refill: Capillary refill takes less than 2 seconds  Neurological:      Mental Status: She is alert and oriented to person, place, and time  Vital Signs  ED Triage Vitals   Temperature Pulse Respirations Blood Pressure SpO2   08/21/20 1043 08/21/20 1043 08/21/20 1043 08/21/20 1051 08/21/20 1043   98 2 °F (36 8 °C) 80 18 128/78 99 %      Temp Source Heart Rate Source Patient Position - Orthostatic VS BP Location FiO2 (%)   08/21/20 1043 08/21/20 1043 08/21/20 1051 08/21/20 1051 --   Temporal Monitor Sitting Left arm       Pain Score       08/21/20 1043       6           Vitals:    08/21/20 1043 08/21/20 1051   BP:  128/78   Pulse: 80    Patient Position - Orthostatic VS:  Sitting         Visual Acuity      ED Medications  Medications - No data to display    Diagnostic Studies  Results Reviewed     None                 No orders to display              Procedures  Procedures         ED Course       US AUDIT      Most Recent Value   Initial Alcohol Screen: US AUDIT-C    1  How often do you have a drink containing alcohol? 4 Filed at: 08/21/2020 1045   2  How many drinks containing alcohol do you have on a typical day you are drinking? 1 Filed at: 08/21/2020 1045   3b  FEMALE Any Age, or MALE 65+: How often do you have 4 or more drinks on one occassion? 0 Filed at: 08/21/2020 1045   Audit-C Score  5 Filed at: 08/21/2020 1045                  MILES/DAST-10      Most Recent Value   How many times in the past year have you    Used an illegal drug or used a prescription medication for non-medical reasons?   Never Filed at: 08/21/2020 1046                                MDM  Number of Diagnoses or Management Options  Otitis media: new and requires workup  Diagnosis management comments: Patient likely has otitis media given the appearance of the inner ear  I suspect the to management drain spontaneously perforated and drained  Will add Augmentin given her history of frequent ear infections  Recommend follow-up with ENT  Patient given fluconazole as she reports history yeast infections with Augmentin in the past   Patient educated regarding their diagnosis and given return and follow-up instructions  Patient is understanding and in agreement with the treatment plan  There are no questions at the time of discharge  Risk of Complications, Morbidity, and/or Mortality  Presenting problems: low  Diagnostic procedures: low  Management options: low    Patient Progress  Patient progress: stable        Disposition  Final diagnoses:   Otitis media     Time reflects when diagnosis was documented in both MDM as applicable and the Disposition within this note     Time User Action Codes Description Comment    8/21/2020 11:34 AM Rockwell Hodgkin Add [H66 [id number]] Otitis media       ED Disposition     ED Disposition Condition Date/Time Comment    Discharge Stable Fri Aug 21, 2020 11:34 AM Shekhar Glasgow discharge to home/self care              Follow-up Information     Follow up With Specialties Details Why Contact Info Additional Information    [facility]   [address]  [address] [place] [id number]  [address] [place] [address], [address] [facility] 2    [address] [facility],[address], [address] [place]  Throat Otolaryngology   [address] [facility]  [address] [facility] [id number] [address] [facility], [address], [address] [facility] [address], [facility], [address]    [address] [facility] Ot[facility]   [address] [facility], [address] [facility], [place], [place], [address]   [phone]          Discharge Medication List as of 8/21/2020 11:38 AM      START taking these medications    Details   amoxicillin-clavulanate (AUGMENTIN) 875-125 mg per tablet Take 1 tablet by mouth every 12 (twelve) hours for 7 days, Starting Fri 8/21/2020, Until Fri 8/28/2020, Normal      fluconazole (DIFLUCAN) 200 mg tablet Take 1 tablet (200 mg total) by mouth daily for 1 dose, Starting Fri 8/21/2020, Until Sat 8/22/2020, Normal         CONTINUE these medications which have NOT CHANGED    Details   ALPRAZolam (XANAX) 0 25 mg tablet Take 1 tablet (0 25 mg total) by mouth as needed for anxiety, Starting Mon 8/17/2020, Normal      azithromycin (ZITHROMAX) 250 mg tablet 2 1st day, 1/d for 4 days, Normal      neomycin-polymyxin-hydrocortisone (CORTISPORIN) otic solution Administer 3 drops into both ears every 6 (six) hours for 5 days, Starting Mon 8/17/2020, Until Sat 8/22/2020, Normal      norethindrone-ethinyl estradiol (GILDESS FE 1/20) 1-20 MG-MCG per tablet Take by mouth, Starting Thu 6/4/2015, Historical Med           No discharge procedures on file      PDMP Review       Value Time User    PDMP Reviewed  Yes 8/17/2020 11:36 AM Unknown MD Jay          ED Provider  Electronically Signed by           Aayush Espinosa PA-C  08/22/20 3989

## 2020-08-21 NOTE — TELEPHONE ENCOUNTER
Pt mom called in and said pt ear is bleeding and so I told her to take pt to ed  Mom will like a ear, nose, throat ref   I told her will let Dr know but not sure when they can get her an appt

## 2020-08-21 NOTE — TELEPHONE ENCOUNTER
I just can notification patient was seen in the emergency department  If patient was seen and use started bleeding she should return to the emergency department    UA put her in next week with another provider for evaluation and determine if ENT consultation is needed

## 2020-12-04 ENCOUNTER — OFFICE VISIT (OUTPATIENT)
Dept: FAMILY MEDICINE CLINIC | Facility: CLINIC | Age: 25
End: 2020-12-04
Payer: COMMERCIAL

## 2020-12-04 VITALS
HEART RATE: 76 BPM | SYSTOLIC BLOOD PRESSURE: 108 MMHG | RESPIRATION RATE: 18 BRPM | WEIGHT: 160 LBS | BODY MASS INDEX: 23.7 KG/M2 | TEMPERATURE: 98.2 F | HEIGHT: 69 IN | DIASTOLIC BLOOD PRESSURE: 68 MMHG

## 2020-12-04 DIAGNOSIS — H69.83 DYSFUNCTION OF BOTH EUSTACHIAN TUBES: Primary | ICD-10-CM

## 2020-12-04 DIAGNOSIS — L20.82 FLEXURAL ECZEMA: ICD-10-CM

## 2020-12-04 PROCEDURE — 3008F BODY MASS INDEX DOCD: CPT | Performed by: FAMILY MEDICINE

## 2020-12-04 PROCEDURE — 99213 OFFICE O/P EST LOW 20 MIN: CPT | Performed by: FAMILY MEDICINE

## 2020-12-04 PROCEDURE — 1036F TOBACCO NON-USER: CPT | Performed by: FAMILY MEDICINE

## 2020-12-04 PROCEDURE — 3725F SCREEN DEPRESSION PERFORMED: CPT | Performed by: FAMILY MEDICINE

## 2021-02-25 ENCOUNTER — OFFICE VISIT (OUTPATIENT)
Dept: FAMILY MEDICINE CLINIC | Facility: CLINIC | Age: 26
End: 2021-02-25
Payer: COMMERCIAL

## 2021-02-25 VITALS
TEMPERATURE: 98.5 F | SYSTOLIC BLOOD PRESSURE: 108 MMHG | HEIGHT: 69 IN | BODY MASS INDEX: 24.93 KG/M2 | WEIGHT: 168.31 LBS | HEART RATE: 76 BPM | DIASTOLIC BLOOD PRESSURE: 62 MMHG

## 2021-02-25 DIAGNOSIS — H69.83 DYSFUNCTION OF BOTH EUSTACHIAN TUBES: ICD-10-CM

## 2021-02-25 DIAGNOSIS — L20.82 FLEXURAL ECZEMA: ICD-10-CM

## 2021-02-25 DIAGNOSIS — J30.9 ALLERGIC RHINITIS, UNSPECIFIED SEASONALITY, UNSPECIFIED TRIGGER: ICD-10-CM

## 2021-02-25 DIAGNOSIS — H65.05 RECURRENT ACUTE SEROUS OTITIS MEDIA OF LEFT EAR: Primary | ICD-10-CM

## 2021-02-25 PROCEDURE — 99213 OFFICE O/P EST LOW 20 MIN: CPT | Performed by: NURSE PRACTITIONER

## 2021-02-25 PROCEDURE — 3008F BODY MASS INDEX DOCD: CPT | Performed by: NURSE PRACTITIONER

## 2021-02-25 PROCEDURE — 1036F TOBACCO NON-USER: CPT | Performed by: NURSE PRACTITIONER

## 2021-02-25 RX ORDER — AMOXICILLIN AND CLAVULANATE POTASSIUM 875; 125 MG/1; MG/1
1 TABLET, FILM COATED ORAL EVERY 12 HOURS SCHEDULED
Qty: 20 TABLET | Refills: 0 | Status: SHIPPED | OUTPATIENT
Start: 2021-02-25 | End: 2021-03-07

## 2021-02-25 NOTE — PROGRESS NOTES
Assessment and Plan:    Problem List Items Addressed This Visit        Respiratory    Allergic rhinitis     Explained to patient that her history of allergies are contributing to her recurrent otitis media  Therefore, she should continue to use Claritin and Flonase daily to help with eustachian tube dysfunction and to prevent further otitis media  Nervous and Auditory    Eustachian tube dysfunction     Continue Claritin and Flonase daily  Recurrent acute serous otitis media of left ear - Primary     Augmentin prescribed  Patient advised that if this does not resolve her symptoms ENT referral will most likely be made  Relevant Medications    amoxicillin-clavulanate (Augmentin) 875-125 mg per tablet       Musculoskeletal and Integument    Eczema     Much improved with hydrocortisone use  Continue current dosage  Diagnoses and all orders for this visit:    Recurrent acute serous otitis media of left ear  -     amoxicillin-clavulanate (Augmentin) 875-125 mg per tablet; Take 1 tablet by mouth every 12 (twelve) hours for 10 days    Dysfunction of both eustachian tubes    Allergic rhinitis, unspecified seasonality, unspecified trigger    Flexural eczema              Subjective:      Patient ID: Sharee Waters is a 23 y o  female  CC:    Chief Complaint   Patient presents with    fluid in ear     pt feels like left ear is full, denies any pain x 1 month       HPI:    Patient has been having pressure in her left ear for about the past 3 months  She also states she feels she can not hear as well out of the left ear  Denies any drainage, mastoid tenderness, fevers, or otalgia  She does report hearing a popping sound in the ear when laying down to go to bed at night  Patient had previously been seen for this in December 2020 and it was felt to be due to allergies so patient was started on Flonase and Claritin D daily but she states she has had minimal relief   Patient does have a history of multiple ear infections of her bilateral ears in the past and states she does have a family history of this problem as well  Eczema: Patient had previously been started on Hydrocortisone cream for eczema noted in her antecubital area bilaterally  She reports great improvement in these areas  She states that the areas are much less dry and cause her no more irritation  The following portions of the patient's history were reviewed and updated as appropriate: allergies, current medications, past family history, past medical history, past social history, past surgical history and problem list       Review of Systems   Constitutional: Negative for chills and fever  HENT: Positive for hearing loss (reports decreased hearing in left ear )  Negative for congestion, dental problem, drooling, ear discharge, ear pain, facial swelling, postnasal drip, rhinorrhea, sinus pressure, sinus pain, sneezing, sore throat, tinnitus and trouble swallowing  Eyes: Negative for pain and visual disturbance  Respiratory: Negative for cough, chest tightness, shortness of breath and wheezing  Cardiovascular: Negative for chest pain, palpitations and leg swelling  Gastrointestinal: Negative for abdominal pain, constipation, diarrhea, nausea and vomiting  Endocrine: Negative for cold intolerance and heat intolerance  Genitourinary: Negative for decreased urine volume, dysuria and hematuria  Musculoskeletal: Negative for arthralgias, back pain and myalgias  Skin: Negative for color change and rash  Allergic/Immunologic: Positive for environmental allergies  Neurological: Negative for dizziness, seizures, syncope, weakness, light-headedness, numbness and headaches  Hematological: Negative for adenopathy  Psychiatric/Behavioral: Negative for confusion  The patient is not nervous/anxious  All other systems reviewed and are negative          Data to review:       Objective:    Vitals: 02/25/21 1227   BP: 108/62   BP Location: Left arm   Patient Position: Sitting   Cuff Size: Standard   Pulse: 76   Temp: 98 5 °F (36 9 °C)   TempSrc: Tympanic   Weight: 76 3 kg (168 lb 5 oz)   Height: 5' 9 4" (1 763 m)        Physical Exam  Vitals signs and nursing note reviewed  Constitutional:       General: She is not in acute distress  Appearance: Normal appearance  She is well-developed  She is not ill-appearing  HENT:      Head: Normocephalic and atraumatic  Right Ear: Hearing normal  No decreased hearing noted  No drainage or swelling  A middle ear effusion is present  There is no impacted cerumen  No mastoid tenderness  Tympanic membrane is not erythematous, retracted or bulging  Left Ear: Decreased hearing noted  No swelling  A middle ear effusion is present  There is no impacted cerumen  No mastoid tenderness  Tympanic membrane is not erythematous, retracted or bulging  Ears:      Comments: Some fluid and small effusions noted behind bilateral TM's  Tympanosclerosis also noted in left TM  Eyes:      Conjunctiva/sclera: Conjunctivae normal    Neck:      Musculoskeletal: Normal range of motion and neck supple  Cardiovascular:      Rate and Rhythm: Normal rate and regular rhythm  Pulses: Normal pulses  Carotid pulses are 2+ on the right side and 2+ on the left side  Posterior tibial pulses are 2+ on the right side and 2+ on the left side  Heart sounds: Normal heart sounds  No murmur  Pulmonary:      Effort: Pulmonary effort is normal  No respiratory distress  Breath sounds: Normal breath sounds  No wheezing or rhonchi  Abdominal:      General: Abdomen is flat  Bowel sounds are normal  There is no distension  Palpations: Abdomen is soft  Tenderness: There is no abdominal tenderness  There is no guarding  Musculoskeletal: Normal range of motion  Right lower leg: No edema  Left lower leg: No edema     Skin:     General: Skin is warm and dry  Capillary Refill: Capillary refill takes less than 2 seconds  Comments: Some redness noted in bilateral AC areas however, much improved from before she started Hydrocortisone  Neurological:      General: No focal deficit present  Mental Status: She is alert and oriented to person, place, and time  Psychiatric:         Mood and Affect: Mood normal          Behavior: Behavior normal          Thought Content:  Thought content normal          Judgment: Judgment normal

## 2021-02-25 NOTE — PATIENT INSTRUCTIONS
Otitis Media, Ambulatory Care   GENERAL INFORMATION:   Otitis media  is an ear infection  Common symptoms include the following:   · Fever or a headache    · Ear pain    · Trouble hearing    · Ear feels plugged or full or you have ringing or buzzing in your ear    · Dizziness or you lose your balance    · Nausea or vomiting  Seek immediate care for the following symptoms:   · Seizure    · Fever and a stiff neck  Treatment for otitis media  may include any of the following:  · NSAIDs  help decrease swelling and pain or fever  This medicine is available with or without a doctor's order  NSAIDs can cause stomach bleeding or kidney problems in certain people  If you take blood thinner medicine, always ask your healthcare provider if NSAIDs are safe for you  Always read the medicine label and follow directions  · Ear drops  to help treat your ear pain  · Antibiotics  to help kill the germs that caused your ear infection  Care for otitis media:   · Use heat  Place a warm, moist washcloth on your ear to decrease pain  Apply for 15 to 20 minutes, 3 to 4 times a day    · Use ice  Ice helps decrease swelling and pain  Use an ice pack or put crushed ice in a plastic bag  Cover the ice pack with a towel and place it on your ear for 15 to 20 minutes, 3 to 4 times a day for 2 days  Prevent otitis media:   · Wash your hands often  This will help prevent the spread of germs  Encourage everyone in your house to wash their hands with soap and water after they use the bathroom  Everyone should also wash their hands after they change a child's diaper and before they prepare or eat food  · Stay away from people who are ill  Germs are easily and quickly spread through contact  Follow up with your healthcare provider as directed:  Write down your questions so you remember to ask them during your visits  CARE AGREEMENT:   You have the right to help plan your care   Learn about your health condition and how it may be treated  Discuss treatment options with your caregivers to decide what care you want to receive  You always have the right to refuse treatment  The above information is an  only  It is not intended as medical advice for individual conditions or treatments  Talk to your doctor, nurse or pharmacist before following any medical regimen to see if it is safe and effective for you  © 2014 [address] is for End User's use only and may not be sold, redistributed or otherwise used for commercial purposes  All illustrations and images included in CareNotes® are the copyrighted property of [employer]  or [employer]  Problem List Items Addressed This Visit        Respiratory    Allergic rhinitis     Explained to patient that her history of allergies are contributing to her recurrent otitis media  Therefore, she should continue to use Claritin and Flonase daily to help with eustachian tube dysfunction and to prevent further otitis media  Nervous and Auditory    Eustachian tube dysfunction     Continue Claritin and Flonase daily  Recurrent acute serous otitis media of left ear - Primary     Augmentin prescribed  Patient advised that if this does not resolve her symptoms ENT referral will most likely be made  Relevant Medications    amoxicillin-clavulanate (Augmentin) 875-125 mg per tablet       Musculoskeletal and Integument    Eczema     Much improved with hydrocortisone use  Continue current dosage  [address]    Your healthcare provider and/or public health staff have evaluated you and have determined that you do not need to remain in the hospital at this time  At this time you can be isolated at home where you will be monitored by staff from your local or state health department   You should carefully follow the prevention and isolation steps below until a healthcare provider or local or [facility] health department says that you can return to your normal activities  Stay home except to get medical care    People who are mildly ill with COVID-19 are able to isolate at home during their illness  You should restrict activities outside your home, except for getting medical care  Do not go to work, school, or public areas  Avoid using public transportation, ride-sharing, or taxis  Separate yourself from other people and animals in your home    People: As much as possible, you should stay in a specific room and away from other people in your home  Also, you should use a separate bathroom, if available  Animals: You should restrict contact with pets and other animals while you are sick with COVID-19, just like you would around other people  Although there have not been reports of pets or other animals becoming sick with COVID-19, it is still recommended that people sick with COVID-19 limit contact with animals until more information is known about the virus  When possible, have another member of your household care for your animals while you are sick  If you are sick with COVID-19, avoid contact with your pet, including petting, snuggling, being kissed or licked, and sharing food  If you must care for your pet or be around animals while you are sick, wash your hands before and after you interact with pets and wear a facemask  See COVID-19 and Animals for more information  Call ahead before visiting your doctor    If you have a medical appointment, call the healthcare provider and tell them that you have or may have COVID-19  This will help the healthcare providers office take steps to keep other people from getting infected or exposed  Wear a facemask    You should wear a facemask when you are around other people (e g , sharing a room or vehicle) or pets and before you enter a healthcare providers office   If you are not able to wear a facemask (for example, because it causes trouble breathing), then people who live with you should not stay in the same room with you, or they should wear a facemask if they enter your room  Cover your coughs and sneezes    Cover your mouth and nose with a tissue when you cough or sneeze  Throw used tissues in a lined trash can  Immediately wash your hands with soap and water for at least 20 seconds or, if soap and water are not available, clean your hands with an alcohol-based hand  that contains at least 60% alcohol  Clean your hands often    Wash your hands often with soap and water for at least 20 seconds, especially after blowing your nose, coughing, or sneezing; going to the bathroom; and before eating or preparing food  If soap and water are not readily available, use an alcohol-based hand  with at least 60% alcohol, covering all surfaces of your hands and rubbing them together until they feel dry  Soap and water are the best option if hands are visibly dirty  Avoid touching your eyes, nose, and mouth with unwashed hands  Avoid sharing personal household items    You should not share dishes, drinking glasses, cups, eating utensils, towels, or bedding with other people or pets in your home  After using these items, they should be washed thoroughly with soap and water  Clean all high-touch surfaces everyday    High touch surfaces include counters, tabletops, doorknobs, bathroom fixtures, toilets, phones, keyboards, tablets, and bedside tables  Also, clean any surfaces that may have blood, stool, or body fluids on them  Use a household cleaning spray or wipe, according to the label instructions  Labels contain instructions for safe and effective use of the cleaning product including precautions you should take when applying the product, such as wearing gloves and making sure you have good ventilation during use of the product  Monitor your symptoms    Seek prompt medical attention if your illness is worsening (e g , difficulty breathing)   Before seeking care, call your healthcare provider and tell them that you have, or are being evaluated for, COVID-19  Put on a facemask before you enter the facility  These steps will help the healthcare providers office to keep other people in the office or waiting room from getting infected or exposed  Ask your healthcare provider to call the local or [facility] health department  Persons who are placed under active monitoring or facilitated self-monitoring should follow instructions provided by their local health department or occupational health professionals, as appropriate  If you have a medical emergency and need to call 911, notify the dispatch personnel that you have, or are being evaluated for COVID-19  If possible, put on a facemask before emergency medical services arrive  Discontinuing home isolation    Patients with confirmed COVID-19 should remain under home isolation precautions until the following conditions are met:   - They have had no fever for at least 24 hours (that is one full day of no fever without the use medicine that reduces fevers)  AND  - other symptoms have improved (for example, when their cough or shortness of breath have improved)  AND  - If had mild or moderate illness, at least 10 days have passed since their symptoms first appeared or if severe illness (needed oxygen) or immunosuppressed, at least 20 days have passed since symptoms first appeared  Patients with confirmed COVID-19 should also notify close contacts (including their workplace) and ask that they self-quarantine  Currently, close contact is defined as being within 6 feet for 15 minutes or more from the period 24 hours starting 48 hours before symptom onset to the time at which the patient went into isolation  Close contacts of patients diagnosed with COVID-19 should be instructed by the patient to self-quarantine for 14 days from the last time of their last contact with the patient       Source: [employer]

## 2021-02-25 NOTE — ASSESSMENT & PLAN NOTE
Augmentin prescribed  Patient advised that if this does not resolve her symptoms ENT referral will most likely be made

## 2021-02-25 NOTE — ASSESSMENT & PLAN NOTE
Explained to patient that her history of allergies are contributing to her recurrent otitis media  Therefore, she should continue to use Claritin and Flonase daily to help with eustachian tube dysfunction and to prevent further otitis media

## 2021-12-06 DIAGNOSIS — Z20.822 CLOSE EXPOSURE TO COVID-19 VIRUS: Primary | ICD-10-CM

## 2021-12-06 PROCEDURE — U0003 INFECTIOUS AGENT DETECTION BY NUCLEIC ACID (DNA OR RNA); SEVERE ACUTE RESPIRATORY SYNDROME CORONAVIRUS 2 (SARS-COV-2) (CORONAVIRUS DISEASE [COVID-19]), AMPLIFIED PROBE TECHNIQUE, MAKING USE OF HIGH THROUGHPUT TECHNOLOGIES AS DESCRIBED BY CMS-2020-01-R: HCPCS | Performed by: NURSE PRACTITIONER

## 2021-12-06 PROCEDURE — U0005 INFEC AGEN DETEC AMPLI PROBE: HCPCS | Performed by: NURSE PRACTITIONER

## 2022-01-24 ENCOUNTER — OFFICE VISIT (OUTPATIENT)
Dept: FAMILY MEDICINE CLINIC | Facility: CLINIC | Age: 27
End: 2022-01-24
Payer: COMMERCIAL

## 2022-01-24 VITALS
HEIGHT: 69 IN | TEMPERATURE: 98.4 F | WEIGHT: 145 LBS | SYSTOLIC BLOOD PRESSURE: 126 MMHG | DIASTOLIC BLOOD PRESSURE: 68 MMHG | HEART RATE: 92 BPM | BODY MASS INDEX: 21.48 KG/M2

## 2022-01-24 DIAGNOSIS — F41.9 ANXIETY: ICD-10-CM

## 2022-01-24 DIAGNOSIS — L20.82 FLEXURAL ECZEMA: ICD-10-CM

## 2022-01-24 DIAGNOSIS — H65.05 RECURRENT ACUTE SEROUS OTITIS MEDIA OF LEFT EAR: Primary | ICD-10-CM

## 2022-01-24 DIAGNOSIS — J30.9 ALLERGIC RHINITIS, UNSPECIFIED SEASONALITY, UNSPECIFIED TRIGGER: ICD-10-CM

## 2022-01-24 DIAGNOSIS — H69.83 DYSFUNCTION OF BOTH EUSTACHIAN TUBES: ICD-10-CM

## 2022-01-24 PROCEDURE — 3725F SCREEN DEPRESSION PERFORMED: CPT | Performed by: NURSE PRACTITIONER

## 2022-01-24 PROCEDURE — 1036F TOBACCO NON-USER: CPT | Performed by: NURSE PRACTITIONER

## 2022-01-24 PROCEDURE — 99214 OFFICE O/P EST MOD 30 MIN: CPT | Performed by: NURSE PRACTITIONER

## 2022-01-24 PROCEDURE — 3008F BODY MASS INDEX DOCD: CPT | Performed by: NURSE PRACTITIONER

## 2022-01-24 RX ORDER — FLUTICASONE PROPIONATE 50 MCG
1 SPRAY, SUSPENSION (ML) NASAL DAILY
COMMUNITY

## 2022-01-24 RX ORDER — LORATADINE 10 MG/1
10 TABLET ORAL DAILY
COMMUNITY

## 2022-01-24 RX ORDER — MONTELUKAST SODIUM 10 MG/1
10 TABLET ORAL
Qty: 30 TABLET | Refills: 5 | Status: SHIPPED | OUTPATIENT
Start: 2022-01-24

## 2022-01-24 RX ORDER — METHYLPREDNISOLONE 4 MG/1
TABLET ORAL
Qty: 21 EACH | Refills: 0 | Status: SHIPPED | OUTPATIENT
Start: 2022-01-24 | End: 2022-05-09

## 2022-01-24 RX ORDER — AZITHROMYCIN 250 MG/1
TABLET, FILM COATED ORAL
Qty: 14 TABLET | Refills: 0 | Status: SHIPPED | OUTPATIENT
Start: 2022-01-24 | End: 2022-01-31

## 2022-01-24 NOTE — PROGRESS NOTES
Assessment and Plan:    Problem List Items Addressed This Visit        Respiratory    Allergic rhinitis     Patient was advised to continue Claritin and Flonase daily  Patient was also started on Singulair HS to further help with allergy symptoms  Patient is currently not interested in seeing ENT or Allergy specialist   Felix Moura allergy panel was ordered  Relevant Medications    montelukast (SINGULAIR) 10 mg tablet    methylPREDNISolone 4 MG tablet therapy pack    Other Relevant Orders    [facility] Panel, Adult       Nervous and Auditory    Eustachian tube dysfunction     Patient advised to take Claritin, Flonase, and Singulair as directed  Relevant Medications    montelukast (SINGULAIR) 10 mg tablet    Other Relevant Orders    [facility] Panel, Adult    Recurrent acute serous otitis media of left ear - Primary     Azithromycin and Medrol Dosepak ordered to treat otitis media  Patient also advised to continue allergy medications as directed  Relevant Medications    azithromycin (ZITHROMAX) 250 mg tablet    methylPREDNISolone 4 MG tablet therapy pack       Musculoskeletal and Integument    Eczema     Well controlled with as needed use of Westcort  Other    Anxiety     Well controlled with as needed use of Xanax  Diagnoses and all orders for this visit:    Recurrent acute serous otitis media of left ear  -     azithromycin (ZITHROMAX) 250 mg tablet; Take 2 tablets (500 mg total) by mouth every 24 hours for 1 day, THEN 2 tablets (500 mg total) every 24 hours for 6 days  -     methylPREDNISolone 4 MG tablet therapy pack; Use as directed on package    Allergic rhinitis, unspecified seasonality, unspecified trigger  -     [facility] Allergy Panel, Adult; Future  -     montelukast (SINGULAIR) 10 mg tablet; Take 1 tablet (10 mg total) by mouth daily at bedtime    Dysfunction of both eustachian tubes  -     [facility] Allergy Panel, Adult;  Future  - montelukast (SINGULAIR) 10 mg tablet; Take 1 tablet (10 mg total) by mouth daily at bedtime    Flexural eczema    Anxiety    Other orders  -     loratadine (CLARITIN) 10 mg tablet; Take 10 mg by mouth daily  -     fluticasone (FLONASE) 50 mcg/act nasal spray; 1 spray into each nostril daily              Subjective:      Patient ID: Jori Amezcua is a 27 y o  female  CC:    Chief Complaint   Patient presents with    Earache     patient c/o left ear discomfort-"feels like a full balloon in there", occasionally in her right ear  No pain, but has blockage somewhat as well  patient takes Claritin with some relief  Patient had nasal congestion but used a nasal spray with relief  ak       HPI:    Left ear congestion:  Patient does have a history of ETD as well as recurrent otitis media of the left ear  Patient was last treated for otitis media of the left ear in February 2021  The patient denies any otalgia but reports that she does feel congestion in the ear  She reports slight hearing loss  She denies any drainage from the ear  She denies any fevers, chills, or mastoid tenderness  Allergic rhinitis/ETD:  Patient is currently taking Claritin and Flonase daily  Patient does report this does control her symptoms for the most part  Eczema:  Well controlled with as needed use of Westcort  Anxiety:  Well controlled with as needed use of Xanax  She reports she has not used this in a few months  The following portions of the patient's history were reviewed and updated as appropriate: allergies, current medications, past family history, past medical history, past social history, past surgical history and problem list       Review of Systems   Constitutional: Negative for chills and fever  HENT: Positive for hearing loss (left ear)  Negative for ear discharge, ear pain, rhinorrhea, sinus pressure, sinus pain and sore throat  Left ear congestion   Eyes: Negative for pain and visual disturbance  Respiratory: Negative for cough, chest tightness, shortness of breath and wheezing  Cardiovascular: Negative for chest pain and palpitations  Gastrointestinal: Negative for abdominal pain, constipation, diarrhea, nausea and vomiting  Endocrine: Negative for cold intolerance and heat intolerance  Genitourinary: Negative for decreased urine volume, dysuria and hematuria  Musculoskeletal: Negative for arthralgias, back pain and myalgias  Skin: Negative for color change and rash  Allergic/Immunologic: Positive for environmental allergies  Neurological: Negative for dizziness, seizures, syncope, weakness, light-headedness, numbness and headaches  Hematological: Negative for adenopathy  Psychiatric/Behavioral: Negative for confusion  The patient is not nervous/anxious  All other systems reviewed and are negative  Data to review:       Objective:    Vitals:    01/24/22 1826   BP: 126/68   Pulse: 92   Temp: 98 4 °F (36 9 °C)   Weight: 65 8 kg (145 lb)   Height: 5' 9" (1 753 m)        Physical Exam  Vitals and nursing note reviewed  Constitutional:       General: She is not in acute distress  Appearance: Normal appearance  She is well-developed  She is not ill-appearing  HENT:      Head: Normocephalic and atraumatic  Right Ear: Hearing normal  A middle ear effusion is present  Left Ear: Decreased hearing noted  Swelling and tenderness present  A middle ear effusion is present  No mastoid tenderness  Tympanic membrane is bulging  Ears:      Comments: Small amount of fluid noted behind right TM  Left TM was noted to be opaque, bulging, and have a large effusion noted behind the area  Tenderness was also noted in the left ear when performing exam   Eyes:      Conjunctiva/sclera: Conjunctivae normal    Cardiovascular:      Rate and Rhythm: Normal rate and regular rhythm  Pulses: Normal pulses             Carotid pulses are 2+ on the right side and 2+ on the left side        Posterior tibial pulses are 2+ on the right side and 2+ on the left side  Heart sounds: Normal heart sounds  No murmur heard  Pulmonary:      Effort: Pulmonary effort is normal  No respiratory distress  Breath sounds: Normal breath sounds  No wheezing or rhonchi  Abdominal:      General: Abdomen is flat  Bowel sounds are normal  There is no distension  Palpations: Abdomen is soft  Tenderness: There is no abdominal tenderness  There is no guarding  Musculoskeletal:         General: Normal range of motion  Cervical back: Normal range of motion and neck supple  Right lower leg: No edema  Left lower leg: No edema  Skin:     General: Skin is warm and dry  Capillary Refill: Capillary refill takes less than 2 seconds  Neurological:      General: No focal deficit present  Mental Status: She is alert and oriented to person, place, and time  Psychiatric:         Mood and Affect: Mood normal          Behavior: Behavior normal          Thought Content: Thought content normal          Judgment: Judgment normal              Depression Screening and Follow-up Plan: Patient was screened for depression during today's encounter  They screened negative with a PHQ-2 score of 0

## 2022-01-24 NOTE — ASSESSMENT & PLAN NOTE
Patient was advised to continue Claritin and Flonase daily  Patient was also started on Singulair HS to further help with allergy symptoms  Patient is currently not interested in seeing ENT or Allergy specialist   [facility] Mckenzie allergy panel was ordered

## 2022-01-24 NOTE — ASSESSMENT & PLAN NOTE
Azithromycin and Medrol Dosepak ordered to treat otitis media  Patient also advised to continue allergy medications as directed

## 2022-05-09 ENCOUNTER — OFFICE VISIT (OUTPATIENT)
Dept: FAMILY MEDICINE CLINIC | Facility: CLINIC | Age: 27
End: 2022-05-09
Payer: COMMERCIAL

## 2022-05-09 VITALS
SYSTOLIC BLOOD PRESSURE: 122 MMHG | HEIGHT: 69 IN | HEART RATE: 96 BPM | TEMPERATURE: 98.2 F | BODY MASS INDEX: 21.48 KG/M2 | DIASTOLIC BLOOD PRESSURE: 66 MMHG | WEIGHT: 145 LBS

## 2022-05-09 DIAGNOSIS — J06.9 UPPER RESPIRATORY TRACT INFECTION, UNSPECIFIED TYPE: Primary | ICD-10-CM

## 2022-05-09 DIAGNOSIS — R59.0 OCCIPITAL LYMPHADENOPATHY: ICD-10-CM

## 2022-05-09 DIAGNOSIS — Z20.822 SUSPECTED COVID-19 VIRUS INFECTION: ICD-10-CM

## 2022-05-09 DIAGNOSIS — J30.9 ALLERGIC RHINITIS, UNSPECIFIED SEASONALITY, UNSPECIFIED TRIGGER: ICD-10-CM

## 2022-05-09 DIAGNOSIS — Z12.4 CERVICAL CANCER SCREENING: ICD-10-CM

## 2022-05-09 PROCEDURE — 87636 SARSCOV2 & INF A&B AMP PRB: CPT | Performed by: NURSE PRACTITIONER

## 2022-05-09 PROCEDURE — 99214 OFFICE O/P EST MOD 30 MIN: CPT | Performed by: NURSE PRACTITIONER

## 2022-05-09 PROCEDURE — 1036F TOBACCO NON-USER: CPT | Performed by: NURSE PRACTITIONER

## 2022-05-09 PROCEDURE — 3008F BODY MASS INDEX DOCD: CPT | Performed by: NURSE PRACTITIONER

## 2022-05-09 NOTE — PROGRESS NOTES
Assessment and Plan:    Problem List Items Addressed This Visit        Respiratory    Allergic rhinitis     Patient was advised to continue Flonase daily  Upper respiratory tract infection - Primary     Due to patient's symptoms improving after taking doses of azithromycin and Medrol Dosepak I do not feel another course of antibiotics is warranted at this point  Patient was reassured that her occipital lymphadenopathy was caused by her recent URI and should resolve on its own in the next few days  COVID/influenza combo swab performed in the office today  I will be in contact with patient once the results are received  Other Visit Diagnoses     Occipital lymphadenopathy        Cervical cancer screening        Relevant Orders    Ambulatory Referral to Obstetrics / Gynecology    Suspected COVID-19 virus infection        Relevant Orders    Covid/Flu- Office Collect                 Diagnoses and all orders for this visit:    Upper respiratory tract infection, unspecified type    Occipital lymphadenopathy    Cervical cancer screening  -     Ambulatory Referral to Obstetrics / Gynecology; Future    Suspected COVID-19 virus infection  -     Covid/Flu- Office Collect    Allergic rhinitis, unspecified seasonality, unspecified trigger            Subjective:      Patient ID: Mir Vega is a 27 y o  female  CC:    Chief Complaint   Patient presents with    Cold Like Symptoms     patient c/o a slight cough and nasal congestion x 3-4 days  Patient took Flonase, but is not taking Singulair, Loratadine and now feels 2 small lumps onthe back of her neck  Patient took her left over Azithromycin and left over Prednisone x 4 days  ak       HPI:    URI symptoms:  Over the past 4 days patient has been experiencing symptoms of productive cough, bilateral otalgia, rhinorrhea, nasal congestion  She denies fevers, chills, SOB, sore throat, PND, dental pain, sinus pressure or congestion   The patient is not vaccinated for COVID and denies any known exposures over the past 2 weeks  The patient reports that she has been taking Azithromycin and Medrol that she had on hand over the past 4 days and this has improved her symptoms  She reports that she has two lumps on the back of her neck over the past 3 days  She does report some tenderness to palpation in this area  Allergic rhinitis:  Patient has been taking Flonase daily but has not been using Singulair or Claritin  The following portions of the patient's history were reviewed and updated as appropriate: allergies, current medications, past family history, past medical history, past social history, past surgical history and problem list       Review of Systems   Constitutional: Negative for chills and fever  HENT: Positive for congestion, ear pain (bilateral) and rhinorrhea  Negative for postnasal drip, sinus pressure, sinus pain and sore throat  Eyes: Negative for pain and visual disturbance  Respiratory: Positive for cough (productive )  Negative for chest tightness, shortness of breath and wheezing  Cardiovascular: Negative for chest pain, palpitations and leg swelling  Gastrointestinal: Negative for abdominal pain, constipation, diarrhea, nausea and vomiting  Endocrine: Negative for cold intolerance and heat intolerance  Genitourinary: Negative for decreased urine volume, dysuria and hematuria  Musculoskeletal: Negative for arthralgias, back pain and myalgias  Skin: Negative for color change and rash  Allergic/Immunologic: Positive for environmental allergies  Neurological: Negative for dizziness, seizures, syncope, weakness, light-headedness, numbness and headaches  Hematological: Negative for adenopathy  Psychiatric/Behavioral: Negative for confusion  The patient is not nervous/anxious  All other systems reviewed and are negative          Data to review:       Objective:    Vitals:    05/09/22 1706   BP: 122/66   Pulse: 96 Temp: 98 2 °F (36 8 °C)   Weight: 65 8 kg (145 lb)   Height: 5' 9" (1 753 m)        Physical Exam  Vitals and nursing note reviewed  Constitutional:       General: She is not in acute distress  Appearance: Normal appearance  She is well-developed  She is not ill-appearing  HENT:      Head: Normocephalic and atraumatic  Right Ear: Hearing and tympanic membrane normal       Left Ear: Hearing and tympanic membrane normal    Eyes:      Conjunctiva/sclera: Conjunctivae normal    Cardiovascular:      Rate and Rhythm: Normal rate and regular rhythm  Pulses: Normal pulses  Carotid pulses are 2+ on the right side and 2+ on the left side  Radial pulses are 2+ on the right side and 2+ on the left side  Posterior tibial pulses are 2+ on the right side and 2+ on the left side  Heart sounds: Normal heart sounds  No murmur heard  Pulmonary:      Effort: Pulmonary effort is normal  No respiratory distress  Breath sounds: Normal breath sounds  No wheezing or rhonchi  Abdominal:      General: Abdomen is flat  Bowel sounds are normal  There is no distension  Palpations: Abdomen is soft  Tenderness: There is no abdominal tenderness  There is no guarding  Musculoskeletal:         General: Normal range of motion  Cervical back: Normal range of motion and neck supple  Right lower leg: No edema  Left lower leg: No edema  Lymphadenopathy:      Head:      Right side of head: Occipital adenopathy present  Left side of head: Occipital adenopathy present  Comments: Bilateral occipital lymphadenopathy noted  Some slight tenderness was noted with palpation of both of these areas  No skin changes were noted in these areas  Skin:     General: Skin is warm and dry  Capillary Refill: Capillary refill takes less than 2 seconds  Neurological:      General: No focal deficit present        Mental Status: She is alert and oriented to person, place, and time  Psychiatric:         Mood and Affect: Mood normal          Behavior: Behavior normal          Thought Content:  Thought content normal          Judgment: Judgment normal

## 2022-05-09 NOTE — ASSESSMENT & PLAN NOTE
Due to patient's symptoms improving after taking doses of azithromycin and Medrol Dosepak I do not feel another course of antibiotics is warranted at this point  Patient was reassured that her occipital lymphadenopathy was caused by her recent URI and should resolve on its own in the next few days  COVID/influenza combo swab performed in the office today  I will be in contact with patient once the results are received

## 2022-05-10 LAB
FLUAV RNA RESP QL NAA+PROBE: NEGATIVE
FLUBV RNA RESP QL NAA+PROBE: NEGATIVE
SARS-COV-2 RNA RESP QL NAA+PROBE: NEGATIVE

## 2022-09-13 ENCOUNTER — OFFICE VISIT (OUTPATIENT)
Dept: OBGYN CLINIC | Facility: CLINIC | Age: 27
End: 2022-09-13
Payer: COMMERCIAL

## 2022-09-13 VITALS
BODY MASS INDEX: 21.51 KG/M2 | WEIGHT: 145.2 LBS | DIASTOLIC BLOOD PRESSURE: 54 MMHG | SYSTOLIC BLOOD PRESSURE: 112 MMHG | HEIGHT: 69 IN

## 2022-09-13 DIAGNOSIS — Z11.3 SCREENING EXAMINATION FOR STD (SEXUALLY TRANSMITTED DISEASE): ICD-10-CM

## 2022-09-13 DIAGNOSIS — Z01.419 ENCOUNTER FOR GYNECOLOGICAL EXAMINATION WITHOUT ABNORMAL FINDING: Primary | ICD-10-CM

## 2022-09-13 DIAGNOSIS — N94.6 DYSMENORRHEA: ICD-10-CM

## 2022-09-13 PROCEDURE — S0610 ANNUAL GYNECOLOGICAL EXAMINA: HCPCS | Performed by: NURSE PRACTITIONER

## 2022-09-13 NOTE — PROGRESS NOTES
Assessment / Plan    1  Encounter for gynecological examination without abnormal finding  Normal well woman exam   10/2020 pap negative  Repeat   Agrees to G/C screening  BC: declines    2  Screening examination for STD (sexually transmitted disease)    - Chlamydia/GC amplified DNA by PCR    3  Dysmenorrhea  Declines management  Will continue to use NSAIDs  *advised to come in every time she is symptomatic for vaginitis in order to prove causal organism  Once proven x 3 will start suppressive treatment  Subjective      Shekhar Glasgow is a 27 y o  female who presents for her annual gynecologic exam   NEW PATIENT    last seen by GYN 10/2020 at [address] [facility]  Last pap: 10/2020  Pap hx: normal  STD screening: 10/2020 G/C  STD hx: hx of chlamydia, txed  Sexually active: yes; 2 year relationship  Condom use: no  Gardasil vaccine: completed    Periods are regular   Current contraception: none  History of abnormal Pap smear: no  Family history of breast,uterine, ovarian or colon cancer: yes - MGM breast ca    Menstrual History:  OB History        0    Para   0    Term   0       0    AB   0    Living   0       SAB   0    IAB   0    Ectopic   0    Multiple   0    Live Births   0           Obstetric Comments   Menarche 14  Cycles regular, Q 27-28d, x 5d, heavy on days 2-3 (super tampon every hour); + cramps, rates 6-7  Tylenol/ naproxen helps  Gardasil: completed              Patient's last menstrual period was 2022  The following portions of the patient's history were reviewed and updated as appropriate: allergies, current medications, past family history, past medical history, past social history, past surgical history and problem list     Review of Systems      Review of Systems   Constitutional: Negative for chills and fever  Respiratory: Negative for cough and shortness of breath      Gastrointestinal: Negative for abdominal distention, abdominal pain, blood in stool, constipation, diarrhea, nausea and vomiting  Genitourinary: Negative for difficulty urinating, dysuria, frequency, genital sores, hematuria, menstrual problem (heavy and painful), pelvic pain, urgency, vaginal bleeding and vaginal discharge  Musculoskeletal: Negative for arthralgias and myalgias  Breasts:  Negative for skin changes, dimpling, asymmetry, nipple discharge, redness, tenderness or palpable masses    Objective      /54 (BP Location: Left arm, Patient Position: Sitting, Cuff Size: Standard)   Ht 5' 9" (1 753 m)   Wt 65 9 kg (145 lb 3 2 oz)   LMP 09/01/2022   BMI 21 44 kg/m²   Physical Exam  Constitutional:       General: She is not in acute distress  Appearance: Normal appearance  She is well-developed and normal weight  She is not ill-appearing or diaphoretic  HENT:      Head: Normocephalic and atraumatic  Eyes:      Pupils: Pupils are equal, round, and reactive to light  Neck:      Thyroid: No thyromegaly  Pulmonary:      Effort: Pulmonary effort is normal    Chest:   Breasts: Breasts are symmetrical       Right: No inverted nipple, mass, nipple discharge, skin change, tenderness or supraclavicular adenopathy  Left: No inverted nipple, mass, nipple discharge, skin change, tenderness or supraclavicular adenopathy  Abdominal:      General: There is no distension  Palpations: Abdomen is soft  There is no mass  Tenderness: There is no abdominal tenderness  There is no guarding or rebound  Genitourinary:     General: Normal vulva  Exam position: Lithotomy position  Labia:         Right: No rash, tenderness, lesion or injury  Left: No rash, tenderness, lesion or injury  Vagina: No signs of injury and foreign body  No vaginal discharge, erythema, tenderness or bleeding  Cervix: No cervical motion tenderness, discharge or friability  Uterus: Not enlarged and not tender  Adnexa:         Right: No mass or tenderness  Left: No mass or tenderness  Musculoskeletal:      Cervical back: Neck supple  Lymphadenopathy:      Cervical: No cervical adenopathy  Upper Body:      Right upper body: No supraclavicular adenopathy  Left upper body: No supraclavicular adenopathy  Skin:     General: Skin is warm and dry  Neurological:      General: No focal deficit present  Mental Status: She is alert and oriented to person, place, and time  Psychiatric:         Mood and Affect: Mood normal          Behavior: Behavior normal          Thought Content:  Thought content normal          Judgment: Judgment normal

## 2022-09-14 LAB
C TRACH RRNA SPEC QL NAA+PROBE: NOT DETECTED
N GONORRHOEA RRNA SPEC QL NAA+PROBE: NOT DETECTED

## 2022-10-12 PROBLEM — H65.05 RECURRENT ACUTE SEROUS OTITIS MEDIA OF LEFT EAR: Status: RESOLVED | Noted: 2021-02-25 | Resolved: 2022-10-12

## 2022-12-27 ENCOUNTER — OFFICE VISIT (OUTPATIENT)
Dept: FAMILY MEDICINE CLINIC | Facility: CLINIC | Age: 27
End: 2022-12-27

## 2022-12-27 VITALS
BODY MASS INDEX: 20.88 KG/M2 | OXYGEN SATURATION: 99 % | WEIGHT: 141 LBS | HEART RATE: 88 BPM | DIASTOLIC BLOOD PRESSURE: 72 MMHG | TEMPERATURE: 98.9 F | SYSTOLIC BLOOD PRESSURE: 116 MMHG | HEIGHT: 69 IN

## 2022-12-27 DIAGNOSIS — H66.006 RECURRENT ACUTE SUPPURATIVE OTITIS MEDIA WITHOUT SPONTANEOUS RUPTURE OF TYMPANIC MEMBRANE OF BOTH SIDES: Primary | ICD-10-CM

## 2022-12-27 DIAGNOSIS — R05.1 ACUTE COUGH: ICD-10-CM

## 2022-12-27 PROBLEM — K62.5 RECTAL BLEED: Status: RESOLVED | Noted: 2018-11-20 | Resolved: 2022-12-27

## 2022-12-27 PROBLEM — J06.9 UPPER RESPIRATORY TRACT INFECTION: Status: RESOLVED | Noted: 2022-05-09 | Resolved: 2022-12-27

## 2022-12-27 RX ORDER — AZITHROMYCIN 250 MG/1
TABLET, FILM COATED ORAL
Qty: 6 TABLET | Refills: 0 | Status: SHIPPED | OUTPATIENT
Start: 2022-12-27 | End: 2022-12-31

## 2022-12-28 LAB
FLUAV RNA RESP QL NAA+PROBE: POSITIVE
FLUBV RNA RESP QL NAA+PROBE: NEGATIVE
SARS-COV-2 RNA RESP QL NAA+PROBE: NEGATIVE

## 2022-12-28 NOTE — PROGRESS NOTES
Name: Alyse Iglesias      : 1995      MRN: [id number]  Encounter Provider: Christine Massey MD  Encounter Date: 2022   Encounter department: [facility] PRIMARY CARE    Assessment & Plan     1  Recurrent acute suppurative otitis media without spontaneous rupture of tympanic membrane of both sides  -     Ambulatory Referral to Otolaryngology; Future  -     azithromycin (ZITHROMAX) 250 mg tablet; 2  1st day, 1/d for  4 days    2  Acute cough  -     Covid/Flu- Office Collect         Subjective      Here for  Recurrent  Ear infections , this  Time  With sx on r, has  Seen ENT  Many years a go but  No consideration to ventilation tubes, gets 1-2  Times  Per  Year  And alternates  Ears, has dx eustachian tube  Dysfunction and has  Done  Nasal steroids    Review of Systems   Constitutional: Negative for activity change, appetite change and fatigue  HENT: Positive for hearing loss, sinus pressure, sinus pain and tinnitus  Negative for congestion, postnasal drip, rhinorrhea and sore throat  Respiratory: Positive for cough  Cardiovascular: Negative for chest pain  Neurological: Negative for dizziness and headaches  Hematological: Negative for adenopathy         Current Outpatient Medications on File Prior to Visit   Medication Sig   • ALPRAZolam (XANAX) 0 25 mg tablet Take 1 tablet (0 25 mg total) by mouth as needed for anxiety   • fluticasone (FLONASE) 50 mcg/act nasal spray 1 spray into each nostril daily   • hydrocortisone (WESTCORT) 0 2 % cream Apply topically 2 (two) times a day   • loratadine (CLARITIN) 10 mg tablet Take 10 mg by mouth daily   • montelukast (SINGULAIR) 10 mg tablet Take 1 tablet (10 mg total) by mouth daily at bedtime   • [DISCONTINUED] neomycin-polymyxin-hydrocortisone (CORTISPORIN) otic solution Administer 3 drops into both ears every 6 (six) hours for 5 days (Patient not taking: Reported on 2021)       Objective     /72 (BP Location: Left arm, Patient Position: Sitting, Cuff Size: Large)   Pulse 88   Temp 98 9 °F (37 2 °C) (Tympanic)   Ht 5' 9" (1 753 m)   Wt 64 kg (141 lb)   SpO2 99%   BMI 20 82 kg/m²     Physical Exam  Vitals reviewed  Constitutional:       Appearance: Normal appearance  HENT:      Right Ear: Tympanic membrane is erythematous  Left Ear: Tympanic membrane normal       Nose: Congestion present  No rhinorrhea  Right Sinus: No maxillary sinus tenderness or frontal sinus tenderness  Left Sinus: No maxillary sinus tenderness or frontal sinus tenderness  Mouth/Throat:      Pharynx: No oropharyngeal exudate or posterior oropharyngeal erythema  Cardiovascular:      Rate and Rhythm: Normal rate and regular rhythm  Pulses: Normal pulses  Heart sounds: Normal heart sounds  Pulmonary:      Effort: Pulmonary effort is normal       Breath sounds: Normal breath sounds  Lymphadenopathy:      Cervical: No cervical adenopathy  Neurological:      Mental Status: She is alert         Josse Murillo MD

## 2022-12-29 ENCOUNTER — TELEPHONE (OUTPATIENT)
Dept: FAMILY MEDICINE CLINIC | Facility: CLINIC | Age: 27
End: 2022-12-29

## 2022-12-29 DIAGNOSIS — J10.1 INFLUENZA A: Primary | ICD-10-CM

## 2022-12-29 DIAGNOSIS — J10.1 INFLUENZA A: ICD-10-CM

## 2022-12-29 RX ORDER — OSELTAMIVIR PHOSPHATE 75 MG/1
75 CAPSULE ORAL 2 TIMES DAILY
Qty: 10 CAPSULE | Refills: 0 | Status: SHIPPED | OUTPATIENT
Start: 2022-12-29 | End: 2022-12-29 | Stop reason: SDUPTHER

## 2022-12-29 RX ORDER — OSELTAMIVIR PHOSPHATE 75 MG/1
75 CAPSULE ORAL 2 TIMES DAILY
Qty: 10 CAPSULE | Refills: 0 | Status: SHIPPED | OUTPATIENT
Start: 2022-12-29 | End: 2023-01-03

## 2022-12-29 NOTE — TELEPHONE ENCOUNTER
Please send Tamiflu to [facility] [address] [facility]  This was sent to wrong pharmacy  Thank you

## 2022-12-29 NOTE — TELEPHONE ENCOUNTER
Patient mom called patient needs medication TAMIFLU sent to [facility] in [facility], please advise

## 2023-05-08 ENCOUNTER — VBI (OUTPATIENT)
Dept: ADMINISTRATIVE | Facility: OTHER | Age: 28
End: 2023-05-08

## 2023-08-11 ENCOUNTER — VBI (OUTPATIENT)
Dept: ADMINISTRATIVE | Facility: OTHER | Age: 28
End: 2023-08-11
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
## 2023-09-19 ENCOUNTER — TELEPHONE (OUTPATIENT)
Dept: OBGYN CLINIC | Facility: CLINIC | Age: 28
End: 2023-09-19

## 2023-09-19 ENCOUNTER — ANNUAL EXAM (OUTPATIENT)
Dept: OBGYN CLINIC | Facility: CLINIC | Age: 28
End: 2023-09-19
Payer: COMMERCIAL

## 2023-09-19 VITALS
SYSTOLIC BLOOD PRESSURE: 112 MMHG | WEIGHT: 155.2 LBS | BODY MASS INDEX: 22.99 KG/M2 | HEIGHT: 69 IN | DIASTOLIC BLOOD PRESSURE: 68 MMHG

## 2023-09-19 DIAGNOSIS — N76.0 RECURRENT VAGINITIS: ICD-10-CM

## 2023-09-19 DIAGNOSIS — Z11.3 SCREENING EXAMINATION FOR STD (SEXUALLY TRANSMITTED DISEASE): ICD-10-CM

## 2023-09-19 DIAGNOSIS — Z12.4 ENCOUNTER FOR PAPANICOLAOU SMEAR FOR CERVICAL CANCER SCREENING: ICD-10-CM

## 2023-09-19 DIAGNOSIS — Z01.411 ENCOUNTER FOR GYNECOLOGICAL EXAMINATION WITH ABNORMAL FINDING: Primary | ICD-10-CM

## 2023-09-19 PROCEDURE — S0612 ANNUAL GYNECOLOGICAL EXAMINA: HCPCS | Performed by: NURSE PRACTITIONER

## 2023-09-19 NOTE — PATIENT INSTRUCTIONS
EXERCISE RECOMMENDATIONS:  Recommend regular exercise, 30 minutes of cardiovascular, 4-5 times a week (brisk walking, jogging, biking, elliptical).

## 2023-09-19 NOTE — TELEPHONE ENCOUNTER
I contacted [facility] for a pickup 4 times. Message stated unable to process call, all contact reps all busy. I left voicemail with rep Hernan Ring for an order .
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
## 2023-09-21 DIAGNOSIS — N76.0 RECURRENT VAGINITIS: Primary | ICD-10-CM

## 2023-09-21 LAB
BV BACTERIA RRNA VAG QL NAA+PROBE: NEGATIVE
C GLABRATA RNA VAG QL NAA+PROBE: NOT DETECTED
C TRACH RRNA SPEC QL NAA+PROBE: NOT DETECTED
C TRACH RRNA SPEC QL NAA+PROBE: NOT DETECTED
CANDIDA RRNA VAG QL PROBE: DETECTED
N GONORRHOEA RRNA SPEC QL NAA+PROBE: NOT DETECTED
N GONORRHOEA RRNA SPEC QL NAA+PROBE: NOT DETECTED
T VAGINALIS RRNA SPEC QL NAA+PROBE: NOT DETECTED

## 2023-09-21 RX ORDER — FLUCONAZOLE 150 MG/1
150 TABLET ORAL ONCE
Qty: 2 TABLET | Refills: 1 | Status: SHIPPED | OUTPATIENT
Start: 2023-09-21 | End: 2023-09-21

## 2023-09-25 LAB
C TRACH RRNA SPEC QL NAA+PROBE: NOT DETECTED
CLINICAL INFO: NORMAL
CYTO CVX: NORMAL
CYTOLOGY CMNT CVX/VAG CYTO-IMP: NORMAL
DATE PREVIOUS BX: NORMAL
LMP START DATE: NORMAL
N GONORRHOEA RRNA SPEC QL NAA+PROBE: NOT DETECTED
SL AMB PREV. PAP:: NORMAL
SPECIMEN SOURCE CVX/VAG CYTO: NORMAL

## 2023-09-26 ENCOUNTER — TELEPHONE (OUTPATIENT)
Dept: OBGYN CLINIC | Facility: CLINIC | Age: 28
End: 2023-09-26

## 2023-09-26 DIAGNOSIS — N76.0 RECURRENT VAGINITIS: Primary | ICD-10-CM

## 2023-09-26 RX ORDER — FLUCONAZOLE 150 MG/1
TABLET ORAL
Qty: 2 TABLET | Refills: 0 | Status: SHIPPED | OUTPATIENT
Start: 2023-09-26 | End: 2023-10-06

## 2023-09-26 NOTE — TELEPHONE ENCOUNTER
Pt called stating fluconazole was sent to [facility] but it looked like there was an issue. She would like to have fluconazole to [facility] on file.

## 2023-11-08 NOTE — ASSESSMENT & PLAN NOTE
Antibiotic as directed  Increase fluids  Continue flonase  May consider adding Mucinex D generic as directed 
No

## 2023-12-27 ENCOUNTER — AMB VIDEO VISIT (OUTPATIENT)
Dept: OTHER | Facility: HOSPITAL | Age: 28
End: 2023-12-27

## 2023-12-27 DIAGNOSIS — H66.90 ACUTE OTITIS MEDIA, UNSPECIFIED OTITIS MEDIA TYPE: Primary | ICD-10-CM

## 2023-12-27 PROCEDURE — ECARE PR SL URGENT CARE VIRTUAL VISIT: Performed by: PHYSICIAN ASSISTANT

## 2023-12-27 RX ORDER — FLUCONAZOLE 150 MG/1
150 TABLET ORAL ONCE
Qty: 1 TABLET | Refills: 0 | Status: SHIPPED | OUTPATIENT
Start: 2023-12-27 | End: 2023-12-27

## 2023-12-27 RX ORDER — AMOXICILLIN AND CLAVULANATE POTASSIUM 875; 125 MG/1; MG/1
1 TABLET, FILM COATED ORAL EVERY 12 HOURS SCHEDULED
Qty: 20 TABLET | Refills: 0 | Status: SHIPPED | OUTPATIENT
Start: 2023-12-27 | End: 2024-01-06

## 2023-12-27 NOTE — PATIENT INSTRUCTIONS
Ear Infection   WHAT YOU NEED TO KNOW:   An ear infection is also called otitis media. Blocked or swollen eustachian tubes can cause an infection. Eustachian tubes connect the middle ear to the back of the nose and throat. They drain fluid from the middle ear. You may have a buildup of fluid in your ear. Germs build up in the fluid and infection develops.       DISCHARGE INSTRUCTIONS:   Return to the emergency department if:   You have clear fluid coming from your ear.    You have a stiff neck, headache, and a fever.    Call your doctor if:   You see blood or pus draining from your ear.    Your ear pain gets worse or does not go away, even after treatment.    The outside of your ear is red or swollen.    You are vomiting or have diarrhea.    You have questions or concerns about your condition or care.    Medicines:  You may  need any of the following:  Acetaminophen  decreases pain and fever. It is available without a doctor's order. Ask how much to take and how often to take it. Follow directions. Read the labels of all other medicines you are using to see if they also contain acetaminophen, or ask your doctor or pharmacist. Acetaminophen can cause liver damage if not taken correctly.    NSAIDs , such as ibuprofen, help decrease swelling, pain, and fever. This medicine is available with or without a doctor's order. NSAIDs can cause stomach bleeding or kidney problems in certain people. If you take blood thinner medicine, always ask your healthcare provider if NSAIDs are safe for you. Always read the medicine label and follow directions.    Ear drops  may contain medicine to decrease pain and inflammation.    Antibiotics  help treat a bacterial infection.    Take your medicine as directed.  Contact your healthcare provider if you think your medicine is not helping or if you have side effects. Tell your provider if you are allergic to any medicine. Keep a list of the medicines, vitamins, and herbs you take. Include  the amounts, and when and why you take them. Bring the list or the pill bottles to follow-up visits. Carry your medicine list with you in case of an emergency.    Self-care:   Apply heat  on your ear for 15 to 20 minutes, 3 to 4 times a day or as directed. You can apply heat with an electric heating pad, hot water bottle, or warm compress. Always put a cloth between your skin and the heat pack to prevent burns. Heat helps decrease pain.    Apply ice  on your ear for 15 to 20 minutes, 3 to 4 times a day for 2 days or as directed. Use an ice pack, or put crushed ice in a plastic bag. Cover it with a towel before you apply it to your ear. Ice decreases swelling and pain.    Prevent an ear infection:   Wash your hands often  to help prevent the spread of germs. Ask everyone in your house to wash their hands with soap and water. Ask them to wash after they use the bathroom or change a diaper. Remind them to wash before they prepare or eat food.         Stay away from people who are ill.  Some germs spread easily and quickly through contact.    Follow up with your doctor as directed:  Write down your questions so you remember to ask them during your visits.  © Copyright [employer] 2023 Information is for End User's use only and may not be sold, redistributed or otherwise used for commercial purposes.  The above information is an  only. It is not intended as medical advice for individual conditions or treatments. Talk to your doctor, nurse or pharmacist before following any medical regimen to see if it is safe and effective for you.

## 2023-12-27 NOTE — PROGRESS NOTES
Required Documentation:  Encounter provider Sherri Schwartz PA-C    Provider located at [facility]  VIRTUAL CARE   [address] [id number]    Identify all parties in room with patient during virtual visit:  No one else    The patient was identified by name and date of birth. Wendy Purdy was informed that this is a telemedicine visit and that the visit is being conducted through the [employer] platform. She agrees to proceed..  My office door was closed. No one else was in the room.  She acknowledged consent and understanding of privacy and security of the video platform. The patient has agreed to participate and understands they can discontinue the visit at any time.    Verification of patient location:    Patient is located at home in the following state in which I hold an active license [place]    Patient is aware this is a billable service.     Reason for visit is No chief complaint on file.       Subjective  HPI   Patient states that she has an ear infection.  It is her left ear and feels clogged and painful.  She has had ear infections in the past and this feels similar. She is congested in her left nostril.  She did nose spray and tylenol for the pain     Past Medical History:   Diagnosis Date    Chlamydia     treated    SUAD (generalized anxiety disorder)     Rectal bleed     happening for about a year    Varicella        Past Surgical History:   Procedure Laterality Date    RI COLONOSCOPY FLX DX W/COLLJ SPEC WHEN PFRMD N/A 12/4/2018    Procedure: COLONOSCOPY with polypectomy;  Surgeon: Mallory Orellana MD;  Location: [facility];  Service: General    TOOTH EXTRACTION          Allergies   Allergen Reactions    Metronidazole      Yeast infection occurs       Review of Systems   Constitutional: Negative.    HENT:  Positive for congestion, ear pain, sinus pressure and sinus pain. Negative for ear discharge.    Respiratory: Negative.      Cardiovascular: Negative.    Gastrointestinal: Negative.    Musculoskeletal: Negative.    Neurological: Negative.    Psychiatric/Behavioral: Negative.         Video Exam    There were no vitals filed for this visit.    Physical Exam  Constitutional:       General: She is not in acute distress.     Appearance: Normal appearance. She is not ill-appearing, toxic-appearing or diaphoretic.   HENT:      Right Ear: External ear normal.      Left Ear: External ear normal.      Nose: Congestion present.   Pulmonary:      Effort: Pulmonary effort is normal.   Lymphadenopathy:      Cervical: Cervical adenopathy present.   Skin:     General: Skin is dry.   Neurological:      General: No focal deficit present.      Mental Status: She is alert and oriented to person, place, and time.   Psychiatric:         Mood and Affect: Mood normal.         Behavior: Behavior normal.         Visit Time  Total Visit Duration: 5 minutes    Assessment/Plan:    Diagnoses and all orders for this visit:    Acute otitis media, unspecified otitis media type  -     amoxicillin-clavulanate (AUGMENTIN) 875-125 mg per tablet; Take 1 tablet by mouth every 12 (twelve) hours for 10 days  -     fluconazole (DIFLUCAN) 150 mg tablet; Take 1 tablet (150 mg total) by mouth once for 1 dose        Patient Instructions   Ear Infection   WHAT YOU NEED TO KNOW:   An ear infection is also called otitis media. Blocked or swollen eustachian tubes can cause an infection. Eustachian tubes connect the middle ear to the back of the nose and throat. They drain fluid from the middle ear. You may have a buildup of fluid in your ear. Germs build up in the fluid and infection develops.       DISCHARGE INSTRUCTIONS:   Return to the emergency department if:   You have clear fluid coming from your ear.    You have a stiff neck, headache, and a fever.    Call your doctor if:   You see blood or pus draining from your ear.    Your ear pain gets worse or does not go away, even after  treatment.    The outside of your ear is red or swollen.    You are vomiting or have diarrhea.    You have questions or concerns about your condition or care.    Medicines:  You may  need any of the following:  Acetaminophen  decreases pain and fever. It is available without a doctor's order. Ask how much to take and how often to take it. Follow directions. Read the labels of all other medicines you are using to see if they also contain acetaminophen, or ask your doctor or pharmacist. Acetaminophen can cause liver damage if not taken correctly.    NSAIDs , such as ibuprofen, help decrease swelling, pain, and fever. This medicine is available with or without a doctor's order. NSAIDs can cause stomach bleeding or kidney problems in certain people. If you take blood thinner medicine, always ask your healthcare provider if NSAIDs are safe for you. Always read the medicine label and follow directions.    Ear drops  may contain medicine to decrease pain and inflammation.    Antibiotics  help treat a bacterial infection.    Take your medicine as directed.  Contact your healthcare provider if you think your medicine is not helping or if you have side effects. Tell your provider if you are allergic to any medicine. Keep a list of the medicines, vitamins, and herbs you take. Include the amounts, and when and why you take them. Bring the list or the pill bottles to follow-up visits. Carry your medicine list with you in case of an emergency.    Self-care:   Apply heat  on your ear for 15 to 20 minutes, 3 to 4 times a day or as directed. You can apply heat with an electric heating pad, hot water bottle, or warm compress. Always put a cloth between your skin and the heat pack to prevent burns. Heat helps decrease pain.    Apply ice  on your ear for 15 to 20 minutes, 3 to 4 times a day for 2 days or as directed. Use an ice pack, or put crushed ice in a plastic bag. Cover it with a towel before you apply it to your ear. Ice  decreases swelling and pain.    Prevent an ear infection:   Wash your hands often  to help prevent the spread of germs. Ask everyone in your house to wash their hands with soap and water. Ask them to wash after they use the bathroom or change a diaper. Remind them to wash before they prepare or eat food.         Stay away from people who are ill.  Some germs spread easily and quickly through contact.    Follow up with your doctor as directed:  Write down your questions so you remember to ask them during your visits.  © Copyright [employer] 2023 Information is for End User's use only and may not be sold, redistributed or otherwise used for commercial purposes.  The above information is an  only. It is not intended as medical advice for individual conditions or treatments. Talk to your doctor, nurse or pharmacist before following any medical regimen to see if it is safe and effective for you.

## 2023-12-27 NOTE — CARE ANYWHERE EVISITS
Visit Summary for MARY LOU GAN - Gender: Female - Date of Birth: 1995  Date: [id number] - Duration: 2 minutes  Patient: MARY LOU GAN  Provider: Sherri Schwartz PA-C    Patient Contact Information  Address  [address]; [place] [address]  [phone]    Visit Topics  Earache [Added By: Self - 2023-12-27]    Triage Questions   What is your current physical address in the event of a medical emergency? Answer []  Are you allergic to any medications? Answer []  Are you now or could you be pregnant? Answer []  Do you have any immune system compromise or chronic lung   disease? Answer []  Do you have any vulnerable family members in the home (infant, pregnant, cancer, elderly)? Answer []     Conversation Transcripts  [0A][0A] [Notification] You are connected with hSerri Schwartz PA-C, Urgent Care Specialist.[0A][Notification] MARY LOU GAN is located in [place].[0A][Notification] MARY LOU GAN has shared health history...[0A]    Diagnosis  Otitis media, unspecified, unspecified ear    Procedures  Value: [id number] Code: CPT-4 UNLISTED E&M SERVICE    Medications Prescribed    No prescriptions ordered    Electronically signed by: Sherri Schwartz PA-C(NPI [id number])

## 2024-03-18 ENCOUNTER — OFFICE VISIT (OUTPATIENT)
Dept: FAMILY MEDICINE CLINIC | Facility: CLINIC | Age: 29
End: 2024-03-18
Payer: COMMERCIAL

## 2024-03-18 VITALS
OXYGEN SATURATION: 97 % | WEIGHT: 169 LBS | TEMPERATURE: 97.2 F | DIASTOLIC BLOOD PRESSURE: 80 MMHG | BODY MASS INDEX: 25.03 KG/M2 | SYSTOLIC BLOOD PRESSURE: 120 MMHG | HEIGHT: 69 IN | HEART RATE: 100 BPM

## 2024-03-18 DIAGNOSIS — J30.9 ALLERGIC RHINITIS, UNSPECIFIED SEASONALITY, UNSPECIFIED TRIGGER: ICD-10-CM

## 2024-03-18 DIAGNOSIS — H69.93 DYSFUNCTION OF BOTH EUSTACHIAN TUBES: Primary | ICD-10-CM

## 2024-03-18 PROCEDURE — 99214 OFFICE O/P EST MOD 30 MIN: CPT | Performed by: NURSE PRACTITIONER

## 2024-03-18 RX ORDER — METHYLPREDNISOLONE 4 MG/1
TABLET ORAL
Qty: 21 EACH | Refills: 0 | Status: SHIPPED | OUTPATIENT
Start: 2024-03-18

## 2024-03-18 RX ORDER — FLUTICASONE PROPIONATE 50 MCG
1 SPRAY, SUSPENSION (ML) NASAL DAILY
Qty: 15.8 ML | Refills: 3 | Status: SHIPPED | OUTPATIENT
Start: 2024-03-18

## 2024-03-18 RX ORDER — LORATADINE 10 MG/1
10 TABLET ORAL DAILY
Qty: 30 TABLET | Refills: 5 | Status: SHIPPED | OUTPATIENT
Start: 2024-03-18

## 2024-03-18 NOTE — ASSESSMENT & PLAN NOTE
I feel the patient's symptoms are being caused by uncontrolled allergies.  Patient was restarted on Claritin 10 mg daily and Flonase daily as well.  I will have her hold off on restarting Singulair at this time until we see how Claritin and Flonase help her symptoms.

## 2024-03-18 NOTE — ASSESSMENT & PLAN NOTE
Patient was advised to restart Claritin and Flonase as directed and she will also be prescribed a Medrol Dosepak to help with congestion.

## 2024-03-18 NOTE — PROGRESS NOTES
Name: Wendy Purdy      : 1995      MRN: [id number]  Encounter Provider: LASHAWN Domínguez  Encounter Date: 3/18/2024   Encounter department: [facility] PRIMARY CARE    Assessment & Plan     1. Dysfunction of both eustachian tubes  Assessment & Plan:  Patient was advised to restart Claritin and Flonase as directed and she will also be prescribed a Medrol Dosepak to help with congestion.    Orders:  -     fluticasone (FLONASE) 50 mcg/act nasal spray; 1 spray into each nostril daily  -     loratadine (CLARITIN) 10 mg tablet; Take 1 tablet (10 mg total) by mouth daily  -     methylPREDNISolone 4 MG tablet therapy pack; Use as directed on package    2. Allergic rhinitis, unspecified seasonality, unspecified trigger  Assessment & Plan:  I feel the patient's symptoms are being caused by uncontrolled allergies.  Patient was restarted on Claritin 10 mg daily and Flonase daily as well.  I will have her hold off on restarting Singulair at this time until we see how Claritin and Flonase help her symptoms.    Orders:  -     fluticasone (FLONASE) 50 mcg/act nasal spray; 1 spray into each nostril daily  -     loratadine (CLARITIN) 10 mg tablet; Take 1 tablet (10 mg total) by mouth daily      Depression Screening and Follow-up Plan: Patient was screened for depression during today's encounter. They screened negative with a PHQ-2 score of 0.    Tobacco Cessation Counseling: Tobacco cessation counseling was provided. The patient is sincerely urged to quit consumption of tobacco. She is not ready to quit tobacco.         Subjective      Bilateral otalgia: Patient reports over the past 24 hours she has been experiencing bilateral otalgia, left ear congestion, and PND.  She denies any fever, chills, or other URI symptoms.    Allergic rhinitis: Patient is currently prescribed Claritin, Flonase, and Singulair however, she reports that she has not been taking these medications recently.      Review of Systems  "  Constitutional:  Negative for chills and fever.   HENT:  Positive for ear pain (bilateral) and postnasal drip. Negative for congestion, rhinorrhea, sinus pressure, sinus pain and sore throat.         Left ear congestion   Eyes:  Negative for pain and visual disturbance.   Respiratory:  Negative for cough, chest tightness, shortness of breath and wheezing.    Cardiovascular:  Negative for chest pain, palpitations and leg swelling.   Gastrointestinal:  Negative for abdominal pain, constipation, diarrhea, nausea and vomiting.   Endocrine: Negative for cold intolerance and heat intolerance.   Genitourinary:  Negative for decreased urine volume, dysuria and hematuria.   Musculoskeletal:  Negative for arthralgias, back pain and myalgias.   Skin:  Negative for color change and rash.   Allergic/Immunologic: Positive for environmental allergies.   Neurological:  Negative for dizziness, seizures, syncope, weakness, light-headedness, numbness and headaches.   Hematological:  Negative for adenopathy.   Psychiatric/Behavioral:  Negative for confusion. The patient is not nervous/anxious.    All other systems reviewed and are negative.      Current Outpatient Medications on File Prior to Visit   Medication Sig   • ALPRAZolam (XANAX) 0.25 mg tablet Take 1 tablet (0.25 mg total) by mouth as needed for anxiety   • hydrocortisone (WESTCORT) 0.2 % cream Apply topically 2 (two) times a day   • [DISCONTINUED] fluticasone (FLONASE) 50 mcg/act nasal spray 1 spray into each nostril daily   • [DISCONTINUED] loratadine (CLARITIN) 10 mg tablet Take 10 mg by mouth daily   • [DISCONTINUED] montelukast (SINGULAIR) 10 mg tablet Take 1 tablet (10 mg total) by mouth daily at bedtime       Objective     /80 (BP Location: Right arm, Patient Position: Sitting, Cuff Size: Adult)   Pulse 100   Temp (!) 97.2 °F (36.2 °C) (Tympanic)   Ht 5' 9\" (1.753 m)   Wt 76.7 kg (169 lb)   SpO2 97%   BMI 24.96 kg/m²     Physical Exam  Vitals and nursing " note reviewed.   Constitutional:       General: She is not in acute distress.     Appearance: Normal appearance. She is not ill-appearing.   HENT:      Head: Normocephalic.      Right Ear: Hearing normal. A middle ear effusion (moderate) is present.      Left Ear: Hearing normal. A middle ear effusion (moderate) is present.   Eyes:      Conjunctiva/sclera: Conjunctivae normal.   Cardiovascular:      Rate and Rhythm: Normal rate and regular rhythm.      Pulses:           Carotid pulses are 2+ on the right side and 2+ on the left side.       Radial pulses are 2+ on the right side and 2+ on the left side.        Posterior tibial pulses are 2+ on the right side and 2+ on the left side.      Heart sounds: Normal heart sounds. No murmur heard.  Pulmonary:      Effort: Pulmonary effort is normal. No respiratory distress.      Breath sounds: Normal breath sounds. No decreased breath sounds, wheezing, rhonchi or rales.   Abdominal:      General: Abdomen is flat. Bowel sounds are normal. There is no distension.      Palpations: Abdomen is soft.      Tenderness: There is no abdominal tenderness. There is no guarding.   Musculoskeletal:         General: Normal range of motion.      Cervical back: Normal range of motion.      Right lower leg: No edema.      Left lower leg: No edema.   Skin:     General: Skin is warm and dry.      Capillary Refill: Capillary refill takes less than 2 seconds.   Neurological:      General: No focal deficit present.      Mental Status: She is alert and oriented to person, place, and time.   Psychiatric:         Mood and Affect: Mood normal.         Behavior: Behavior normal.         Thought Content: Thought content normal.         Judgment: Judgment normal.       LASHAWN Domínguez

## 2024-09-24 ENCOUNTER — ANNUAL EXAM (OUTPATIENT)
Dept: OBGYN CLINIC | Facility: CLINIC | Age: 29
End: 2024-09-24
Payer: COMMERCIAL

## 2024-09-24 VITALS
HEIGHT: 69 IN | DIASTOLIC BLOOD PRESSURE: 74 MMHG | WEIGHT: 171.2 LBS | SYSTOLIC BLOOD PRESSURE: 114 MMHG | BODY MASS INDEX: 25.36 KG/M2

## 2024-09-24 DIAGNOSIS — Z01.411 ENCOUNTER FOR GYNECOLOGICAL EXAMINATION WITH ABNORMAL FINDING: Primary | ICD-10-CM

## 2024-09-24 DIAGNOSIS — Z11.3 SCREENING EXAMINATION FOR STD (SEXUALLY TRANSMITTED DISEASE): ICD-10-CM

## 2024-09-24 DIAGNOSIS — N92.6 IRREGULAR PERIODS/MENSTRUAL CYCLES: ICD-10-CM

## 2024-09-24 DIAGNOSIS — N76.0 RECURRENT VAGINITIS: ICD-10-CM

## 2024-09-24 PROCEDURE — 87660 TRICHOMONAS VAGIN DIR PROBE: CPT | Performed by: NURSE PRACTITIONER

## 2024-09-24 PROCEDURE — 87510 GARDNER VAG DNA DIR PROBE: CPT | Performed by: NURSE PRACTITIONER

## 2024-09-24 PROCEDURE — S0612 ANNUAL GYNECOLOGICAL EXAMINA: HCPCS | Performed by: NURSE PRACTITIONER

## 2024-09-24 PROCEDURE — 87491 CHLMYD TRACH DNA AMP PROBE: CPT | Performed by: NURSE PRACTITIONER

## 2024-09-24 PROCEDURE — 87480 CANDIDA DNA DIR PROBE: CPT | Performed by: NURSE PRACTITIONER

## 2024-09-24 PROCEDURE — 87591 N.GONORRHOEAE DNA AMP PROB: CPT | Performed by: NURSE PRACTITIONER
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
## 2024-09-24 NOTE — PATIENT INSTRUCTIONS
"Patient Education     Diet and health   The Basics   Written by the doctors and editors at [facility]   Why is it important to eat a healthy diet? -- It's important to eat a healthy diet because eating the right foods can keep you healthy now and later in life. It can lower the risk of problems like heart disease, diabetes, high blood pressure, and some types of cancer. It can also help you live longer and improve your quality of life.  What kind of diet is best? -- There is no 1 specific diet that experts recommend for everyone. People choose what foods to eat for many different reasons. These include personal preference, culture, Latter-day, allergies or intolerances, and nutritional goals. People also need to consider the cost and availability of different foods.  In general, experts recommend a diet that:   Includes lots of vegetables, fruits, beans, nuts, and whole grains   Limits red and processed meats, unhealthy fats, sugar, salt, and alcohol  What are dietary patterns? -- A dietary \"pattern\" means generally eating certain types of foods while limiting others. Some people need to follow a specific dietary pattern because of their health needs. For example, if you have high blood pressure, your doctor might recommend a diet low in salt.  If you are trying to improve your health in general, choosing a healthy dietary pattern can help. This does not have to mean being very strict about what you eat or avoid. The goal is to think about getting plenty of healthy foods while limiting less healthy foods.  Examples of dietary patterns include:   Mediterranean diet - This involves eating a lot of fruits, vegetables, nuts, and whole grains, and uses olive oil instead of other fats. It also includes some fish, poultry, and dairy products, but not a lot of red meat. Following this diet can help your overall health, and might even lower your risk of having a stroke.   Plant-based diets - These patterns focus on vegetables, " "fruits, grains, beans, and nuts. They limit or avoid food that comes from animals, such as meat and dairy. There are different types of plant-based diets, including vegetarian and vegan.   Low-fat diet - A low-fat diet involves limiting calories from fat. This might help some people keep weight off if that is their goal, but it does not have many other health benefits. If you choose to follow a low-fat diet, it is also important to focus on getting lots of whole grains, legumes, fruits, and vegetables. Limit refined grains and sugar.   Low-cholesterol diet - Cholesterol is found in foods with a lot of saturated fat, like red meat, butter, and cheese. A low-cholesterol diet focuses on limiting the amount of cholesterol that you eat. Limiting the cholesterol in your diet can also help lower the amount of unhealthy fats that you eat.  Which foods are especially healthy? -- Foods that are especially healthy include:   Fruits and vegetables - Eating a diet with lots of fruits and vegetables can help prevent heart disease and stroke. It might also help prevent certain types of cancer. Try to eat fruits and vegetables at each meal and also for snacks. If you don't have fresh fruits and vegetables available, you can eat frozen or canned ones instead. Doctors recommend eating at least 5 servings of fruits or vegetables each day.   Whole grains - Whole-grain foods include 100 percent whole-wheat bread, steel cut oats, and whole-grain pasta. These are healthier than foods made with \"refined\" grains, like white bread and white rice. Eating lots of whole grains instead of refined grains has been shown to help with weight control. It can also lower the risk of several health problems, including colon cancer, heart disease, and diabetes. Doctors recommend that most people try to eat 5 to 8 servings of whole-grain, high-fiber foods each day.   Foods with fiber - Eating foods with a lot of fiber can help prevent heart disease and " "stroke. If you have type 2 diabetes, it can also help control your blood sugar. Foods that have a lot of fiber include vegetables, fruits, beans, nuts, oatmeal, and whole-grain breads and cereals. You can tell how much fiber is in a food by reading the nutrition label (figure 1). Doctors recommend that most people eat about 25 to 34 grams of fiber each day.   Foods with calcium and vitamin D - Babies, children, and adults need calcium and vitamin D to help keep their bones strong. Adults also need calcium and vitamin D to help prevent osteoporosis. Osteoporosis is a condition that causes bones to get \"thin\" and break more easily than usual. Different foods and drinks have calcium and vitamin D in them (figure 2). People who don't get enough calcium and vitamin D in their diet might need to take a supplement. Doctors recommend that most people have 2 to 3 servings of foods with calcium and vitamin D each day.   Foods with protein - Protein helps your muscles and bones stay strong. Healthy foods with a lot of protein include chicken, fish, eggs, beans, nuts, and soy products. Red meat also has a lot of protein, but it also contains fats, which can be unhealthy. Doctors recommend that most people try to eat about 5 servings of protein each day.   Healthy fats - There are different types of fats. Some types of fats are better for your body than others. Healthy fats are \"monounsaturated\" or \"polyunsaturated\" fats. These are found in fatty fish, nuts and nut butters, and avocados. Use plant-based oils when cooking. Examples of these oils include olive, canola, safflower, sunflower, and corn oil. Eating foods with healthy fats, while avoiding or limiting foods with unhealthy fats, might lower the risk of heart disease.   Foods with folate - Folate is a vitamin that is important for pregnant people, since it helps prevent certain birth defects. It is also called \"folic acid.\" Anyone who could get pregnant should get at " "least 400 micrograms of folic acid daily, whether or not they are actively trying to get pregnant. Folate is found in many breakfast cereals, oranges, orange juice, and green leafy vegetables.  What foods should I avoid or limit? -- To eat a healthy diet, there are some things that you should avoid or limit. They include:   Unhealthy fats - \"Trans\" fats are especially unhealthy. They are found in margarines, many fast foods, and some store-bought baked goods. \"Saturated\" fats are found in animal products like meats, egg yolks, butter, cheese, and full-fat milk products. Unhealthy fats can raise your cholesterol level and increase your chance of getting heart disease.   Sugar - To have a healthy diet, it's important to limit or avoid added sugar, sweets, and refined grains. Refined grains are found in white bread, white rice, most pastas, and most packaged \"snack\" foods.  Avoiding sugar-sweetened beverages, like soda and sports drinks, can also help improve your health.  Avoid canned fruits in \"heavy\" syrup.   Red and processed meats - Studies have shown that eating a lot of red meat can increase your risk of certain health problems, including heart disease and cancer. You should limit the amount of red meat that you eat. This is also true for processed meats like sausage, hot dogs, and ricci.  Can I drink alcohol as part of a healthy diet? -- Not drinking alcohol at all is the healthiest choice. Regular drinking can raise a person's chances of getting liver disease and certain types of cancers. In females, even 1 drink a day can increase the risk of getting breast cancer.  If you do choose to drink, most doctors recommend limiting alcohol to no more than:   1 drink a day for females   2 drinks a day for males  The limits are different because, generally, the female body takes longer to break down alcohol.  How many calories do I need each day? -- Calories give your body energy. The number of calories that you need " "each day depends on your weight, height, age, sex, and how active you are.  Your doctor or nurse can tell you about how many calories you should eat each day. You can also work with a dietitian (nutrition expert) to learn more about your dietary needs and options.  What if I am having trouble improving my diet? -- It can be hard to change the way that you eat. Remember that even small changes can improve your health.  Here are some tips that might help:   Try to make fruits and vegetables part of every meal. If you don't have fresh fruits and vegetables, frozen or canned are good options. Look for products without added salt or sugar.   Keep a bowl of fruit out for snacking.   When you can, choose whole grains instead of refined grains. Choose chicken, fish, and beans instead of red meat and cheese.   Try to eat prepared and processed foods less often.   Try flavored seltzer or water instead of soda or juice.   When eating at fast food restaurants, look for healthier items, like broiled chicken or salad.  If you have questions about which foods you should or should not eat, ask your doctor, nurse, or dietitian. The right diet for you will depend, in part, on your health and any medical conditions you have.  All topics are updated as new evidence becomes available and our peer review process is complete.  This topic retrieved from [employer] on: Feb 28, 2024.  Topic 52325 Version 28.0  Release: 32.2.4 - C32.58  © 2024 UpToDate, Inc. and/or its affiliates. All rights reserved.  figure 1: Nutrition label - Fiber     This is an example of a nutrition label. To figure out how much fiber is in a food, look for the line that says \"Dietary Fiber.\" It's also important to look at the serving size. This food has 7 grams of fiber in each serving, and each serving is 1 cup.  Graphic 31348 Version 8.0  figure 2: Foods and drinks with calcium and vitamin D     Foods rich in calcium include ice cream, soy milk, breads, kale, " "broccoli, milk, cheese, cottage cheese, almonds, yogurt, ready-to-eat cereals, beans, and tofu. Foods rich in vitamin D include milk, fortified plant-based \"milks\" (soy, almond), canned tuna fish, cod liver oil, yogurt, ready-to-eat-cereals, cooked salmon, canned sardines, mackerel, and eggs. Some of these foods are rich in both.  Graphic 03321 Version 4.0  Consumer Information Use and Disclaimer   Disclaimer: This generalized information is a limited summary of diagnosis, treatment, and/or medication information. It is not meant to be comprehensive and should be used as a tool to help the user understand and/or assess potential diagnostic and treatment options. It does NOT include all information about conditions, treatments, medications, side effects, or risks that may apply to a specific patient. It is not intended to be medical advice or a substitute for the medical advice, diagnosis, or treatment of a health care provider based on the health care provider's examination and assessment of a patient's specific and unique circumstances. Patients must speak with a health care provider for complete information about their health, medical questions, and treatment options, including any risks or benefits regarding use of medications. This information does not endorse any treatments or medications as safe, effective, or approved for treating a specific patient. UpToDate, Inc. and its affiliates disclaim any warranty or liability relating to this information or the use thereof.The use of this information is governed by the Terms of Use, available at https://www.wolterskluwer.com/en/know/clinical-effectiveness-terms. 2024© UpToDate, Inc. and its affiliates and/or licensors. All rights reserved.  Copyright   © 2024 UpToDate, Inc. and/or its affiliates. All rights reserved.  Patient Education     Calcium Rich Diet   About this topic   Calcium is one of the most important minerals and is found in almost all parts of your " body. It makes your teeth and bones strong and healthy. Your body does not make calcium. It is important for you to eat calcium rich foods to get enough calcium. It also helps your body:  Make blood clots  Keep your heartbeat normal  Helps blood move throughout the body  Release hormones  Release enzymes  Send and get signals between your nerves and brain  Make muscles work well         What will the results be?   It is important to have a good amount of calcium in your food. Calcium helps your body work well. It is very important during every life stage. Calcium may also keep you from losing bone mass. This is osteopenia. It may help keep you from having weak bones. This is osteoporosis.  What drugs may be needed?   The doctor may order calcium and vitamin D supplements. You need vitamin D to help your body take in the calcium. Your calcium supplement may have vitamin D in it.  Talk to your doctor about:  If it is OK to take your calcium with food.  How often to take your calcium supplement throughout the day.  All the drugs you are taking. Be sure to include all prescription and over-the-counter (OTC) drugs, and herbal supplements. Tell the doctor about any drug allergy. Bring a list of drugs you take with you. Some drugs may cause problems with how your body takes in calcium.  Will physical activity be limited?   No, physical activities will not be limited. It is good for you to do light exercises and to stay active.  What changes to diet are needed?   You will need to watch how much calcium is in the foods you eat. Your doctor will talk to you about the right amount of calcium for you. How much calcium you need is based on your age and life stage.  When is this diet used?   This diet is used when your normal diet is low in calcium. It is needed to raise the amount of calcium in your diet. Our bodies do not take in calcium well as we get older.  Who should not use this diet?   People with too much calcium in  their blood should not use this diet. This is called hypercalcemia.  What foods are good to eat?   Milk, yogurt, and cheese products are naturally high in calcium.  These foods have smaller amounts of calcium. If they are eaten often and in large amounts they can be good sources of calcium:  Oysters; dried fruit like figs and raisins; green leafy vegetables like broccoli, collards, kale, mustard greens, bok emely; soy beans; oranges  [place] and sardines. Be sure to eat the soft bones.  Nuts like almonds and [place] nuts, sunflower seeds, tahini, dried beans  Food products with calcium added to them like orange juice, tofu, cereal, bread; almond milk, rice milk, soy milk  What foods should be limited or avoided?   People who eat many kinds of foods do not have to be worried about limiting or avoiding certain foods.   What problems could happen?   Some people may get kidney stones. This may happen after taking high amounts of calcium over a long time. Calcium from food does not seem to cause kidney stones.  Hard stools.  Too much calcium may interfere with your body’s ability to absorb iron and zinc.  When do I need to call the doctor?   Call your doctor if you have concerns about your diet. Tell your doctor if you are allergic to any of the foods in your diet.  Helpful tips   If you have problems digesting milk, try lactose-free milk. You may also use a product to help you take in lactose.  Talk with your doctor if you are a vegetarian and do not eat dairy products. Your doctor can help you make sure you get the amount of calcium your body needs.  Last Reviewed Date   2021-03-12  Consumer Information Use and Disclaimer   This generalized information is a limited summary of diagnosis, treatment, and/or medication information. It is not meant to be comprehensive and should be used as a tool to help the user understand and/or assess potential diagnostic and treatment options. It does NOT include all information about  conditions, treatments, medications, side effects, or risks that may apply to a specific patient. It is not intended to be medical advice or a substitute for the medical advice, diagnosis, or treatment of a health care provider based on the health care provider's examination and assessment of a patient’s specific and unique circumstances. Patients must speak with a health care provider for complete information about their health, medical questions, and treatment options, including any risks or benefits regarding use of medications. This information does not endorse any treatments or medications as safe, effective, or approved for treating a specific patient. UpToDate, Inc. and its affiliates disclaim any warranty or liability relating to this information or the use thereof. The use of this information is governed by the Terms of Use, available at https://www.[employer].com/en/know/clinical-effectiveness-terms   Copyright   Copyright © 2024 UpToDate, Inc. and its affiliates and/or licensors. All rights reserved.  Patient Education     Exercise and movement   The Basics   Written by the doctors and editors at [email or web address]   What are the benefits of movement? -- Moving your body has many benefits. It can:   Burn calories, which helps people manage their weight   Help control blood sugar levels in people with diabetes   Lower blood pressure, especially in people with high blood pressure   Lower stress, and help with depression and anxiety   Keep bones strong, so they don't get thin and break easily   Lower the chance of dying from heart disease  Adding even small amounts of physical activity to your daily routine can improve your health.  What are the main types of exercise? -- There are 3 main types of exercise:   Aerobic exercise - This raises your heart rate. Examples include walking, running, dancing, riding a bike, and swimming.   Muscle strengthening - This helps make your muscles stronger. You can do it using  weights, exercise bands, or weight machines. You can also use your own body weight, as with push-ups, or by lifting items in your home, like jugs of water.   Stretching - These help your muscles and joints move more easily.  It's important to have all 3 types in your exercise program. That way, your body, muscles, and joints can be as healthy as possible.  Should I talk to my doctor or nurse before I start exercising? -- If you have not exercised before or have not exercised in a long time, talk with your doctor or nurse before you start a very active exercise program.  If you have heart disease or risk factors for heart disease (like high blood pressure or diabetes), your doctor or nurse might recommend that you have an exercise test before starting an exercise program.  When you begin an exercise program, start slowly. For example, do the exercise at a slow pace or for a few minutes only. Over time, you can exercise faster and for longer periods of time.  What should I do when I exercise? -- Each time you exercise, you should:   Warm up - This can help keep you from hurting your muscles when you exercise. To warm up, do a light aerobic exercise (such as walking slowly) or stretch for 5 to 10 minutes.   Work out - Try to get a mix of aerobic exercise, muscle strengthening, and stretching. During an aerobic workout, you can walk fast, swim, run, or use an exercise machine, for example. Other activities, like dancing or playing tennis, are also forms of aerobic exercise. You should also take time to stretch all of your joints, including your neck, shoulders, back, hips, and knees. At least 2 times a week, you can do muscle strengthening exercises as part of your workout.   Cool down - This helps keep you from feeling dizzy after you exercise and helps prevent muscle cramps. To cool down, you can stretch or do a light aerobic exercise for 5 minutes.  Some people go to a gym or do group exercise classes. But you can  exercise even without these things. Some exercises can be done even in a small space. You can also try online videos or smartphone apps to get ideas for different types of exercise.  How often should I exercise? -- Doctors recommend that people exercise at least 30 minutes a day, on 5 or more days of the week.  If you can't exercise for 30 minutes straight, try to exercise for 10 minutes at a time, 3 or 4 times a day. Even exercising for shorter amounts of time is good for you, especially if it means spending less time sitting.  When should I call my doctor or nurse? -- If you have any of the following symptoms when you exercise, stop exercising and call your doctor or nurse right away:   Pain or pressure in your chest, arms, throat, jaw, or back   Nausea or vomiting   Feeling like your heart is fluttering or racing very fast   Feeling dizzy or faint  What if I don't have time to exercise? -- Many people have very busy lives and might not think that they have time to exercise. But it's important to try to find time, even if you are tired or work a lot. Exercise can increase your energy level, which can make you feel better and might even help you get more work done.  Even if it's hard to set aside a lot of time to exercise, you can still improve your health by moving your body more. There are many ways that you can be more active. For example, you can:   Take the stairs instead of the elevator.   Park in a parking space that is farther away from the door.   Take a longer route when you walk from one place to another.  Spending a lot of time sitting still (for example, watching TV or working on the computer) is bad for your health. Try to get up and move around whenever you can. Even small amounts of movement, like taking short walks, doing household chores, or gardening, can improve your health. Finding activities that you enjoy, or doing them with other people, can help you add more movement into your daily  life.  What else should I do when I exercise? -- To exercise safely and avoid problems, it's important to:   Drink fluids during and after exercising (but avoid drinks with a lot of caffeine or sugar).   Avoid exercising outside if it is too hot or cold.   Wear layers of clothes, so that you can take them off if you get too hot.   Wear shoes that fit well and support your feet.   Be aware of your surroundings if you exercise outside.  All topics are updated as new evidence becomes available and our peer review process is complete.  This topic retrieved from [employer] on: May 18, 2024.  Topic 94749 Version 31.0  Release: 32.4.3 - C32.137  © 2024 UpToDate, Inc. and/or its affiliates. All rights reserved.  Consumer Information Use and Disclaimer   Disclaimer: This generalized information is a limited summary of diagnosis, treatment, and/or medication information. It is not meant to be comprehensive and should be used as a tool to help the user understand and/or assess potential diagnostic and treatment options. It does NOT include all information about conditions, treatments, medications, side effects, or risks that may apply to a specific patient. It is not intended to be medical advice or a substitute for the medical advice, diagnosis, or treatment of a health care provider based on the health care provider's examination and assessment of a patient's specific and unique circumstances. Patients must speak with a health care provider for complete information about their health, medical questions, and treatment options, including any risks or benefits regarding use of medications. This information does not endorse any treatments or medications as safe, effective, or approved for treating a specific patient. UpToDate, Inc. and its affiliates disclaim any warranty or liability relating to this information or the use thereof.The use of this information is governed by the Terms of Use, available at  https://www.w[employer]ysuwer.com/en/know/clinical-effectiveness-terms. 2024© [employer] and its affiliates and/or licensors. All rights reserved.  Copyright   © 2024 [employer] and/or its affiliates. All rights reserved.

## 2024-09-25 DIAGNOSIS — B96.89 BACTERIAL VAGINOSIS: Primary | ICD-10-CM

## 2024-09-25 DIAGNOSIS — N76.0 BACTERIAL VAGINOSIS: Primary | ICD-10-CM

## 2024-09-25 DIAGNOSIS — N76.0 RECURRENT VAGINITIS: ICD-10-CM

## 2024-09-25 LAB
C TRACH DNA SPEC QL NAA+PROBE: NEGATIVE
CANDIDA RRNA VAG QL PROBE: NOT DETECTED
G VAGINALIS RRNA GENITAL QL PROBE: DETECTED
N GONORRHOEA DNA SPEC QL NAA+PROBE: NEGATIVE
T VAGINALIS RRNA GENITAL QL PROBE: NOT DETECTED

## 2024-09-25 RX ORDER — FLUCONAZOLE 150 MG/1
150 TABLET ORAL ONCE
Qty: 2 TABLET | Refills: 0 | Status: SHIPPED | OUTPATIENT
Start: 2024-09-25 | End: 2024-09-25

## 2024-09-25 RX ORDER — METRONIDAZOLE 500 MG/1
500 TABLET ORAL EVERY 12 HOURS SCHEDULED
Qty: 14 TABLET | Refills: 0 | Status: SHIPPED | OUTPATIENT
Start: 2024-09-25 | End: 2024-10-02

## 2024-09-25 NOTE — RESULT ENCOUNTER NOTE
Vaginal culture is positive for bacterial vaginosis  Will treat with oral metronidazole with fluconazole to follow.

## 2024-12-14 ENCOUNTER — OFFICE VISIT (OUTPATIENT)
Dept: URGENT CARE | Facility: MEDICAL CENTER | Age: 29
End: 2024-12-14
Payer: COMMERCIAL

## 2024-12-14 VITALS
DIASTOLIC BLOOD PRESSURE: 88 MMHG | OXYGEN SATURATION: 100 % | SYSTOLIC BLOOD PRESSURE: 132 MMHG | HEART RATE: 110 BPM | RESPIRATION RATE: 18 BRPM | TEMPERATURE: 98.4 F

## 2024-12-14 DIAGNOSIS — B37.9 ANTIBIOTIC-INDUCED YEAST INFECTION: ICD-10-CM

## 2024-12-14 DIAGNOSIS — T36.95XA ANTIBIOTIC-INDUCED YEAST INFECTION: ICD-10-CM

## 2024-12-14 DIAGNOSIS — H66.001 NON-RECURRENT ACUTE SUPPURATIVE OTITIS MEDIA OF RIGHT EAR WITHOUT SPONTANEOUS RUPTURE OF TYMPANIC MEMBRANE: ICD-10-CM

## 2024-12-14 DIAGNOSIS — J01.40 ACUTE NON-RECURRENT PANSINUSITIS: Primary | ICD-10-CM

## 2024-12-14 PROCEDURE — G0382 LEV 3 HOSP TYPE B ED VISIT: HCPCS

## 2024-12-14 PROCEDURE — S9083 URGENT CARE CENTER GLOBAL: HCPCS

## 2024-12-14 RX ORDER — FLUCONAZOLE 150 MG/1
150 TABLET ORAL DAILY
Qty: 2 TABLET | Refills: 0 | Status: SHIPPED | OUTPATIENT
Start: 2024-12-14 | End: 2024-12-16

## 2024-12-14 NOTE — PATIENT INSTRUCTIONS
Take antibiotics as prescribed for sinus and ear infection  For decongestion, Over The Counter medications:  2nd Generation antihistamines with a decongestant such as:   Claritin-D (Loratadine with Pseudoephedrine) or  Zyrtec-D (Cetirizine with Pseudoephedrine) or   Allegra-D (Fexofenadine with Pseudoephedrine) or   Decongestant alone: Pseudoephedrine 60mg PO every 4-6 hours for nasal congestion. Max 240mg in 24 hour period  Claritin or Zyrtec plus -Coricidin HBP (Safe for when you have high blood pressure)  Nasal corticosteroid: examples are Flonase or Nasacort.  Nasal saline irrigation  Humidified air  Warm moist air such as a hot cup of water in a mug, sit at the dining room table with the mug on the table, put a towel over your head to cover over the mug and breath in the warm steam (don't drink the fluid in case you have mucus that drips in).  Vicks Vapor Rub  Over the counter Mucinex and increase you fluid intake.  For Cough or sore throat:  Salt water gurgle  Teaspoon of Honey up to 3x/day  Chloraseptic spray  Throat lozenges  Over the Counter Tylenol or Ibuprofen  Dextromethorphan 30mg PO every 6-8 hours for cough. max 120 mg in 24 hour period.  I have sent Fluconazole in case you develop yeast infection from the antibiotics.    Follow up with Primary Care Provider in 3-5 days if not improving.  Proceed to Emergency Department if symptoms worsen.    If tests have been performed at [facility], our office will contact you with results if changes need to be made to the care plan discussed with you at the visit.  You can review your full results on [facility] MyChart.

## 2024-12-14 NOTE — PROGRESS NOTES
[facility]        NAME: Wendy Purdy is a 28 y.o. female  : 1995    MRN: [id number]  DATE: 2024  TIME: 5:50 PM    Assessment and Plan   Acute non-recurrent pansinusitis [J01.40]  1. Acute non-recurrent pansinusitis  amoxicillin-clavulanate (AUGMENTIN) 875-125 mg per tablet      2. Non-recurrent acute suppurative otitis media of right ear without spontaneous rupture of tympanic membrane  amoxicillin-clavulanate (AUGMENTIN) 875-125 mg per tablet      3. Antibiotic-induced yeast infection  fluconazole (DIFLUCAN) 150 mg tablet            Patient Instructions   Take antibiotics as prescribed for sinus and ear infection  For decongestion, Over The Counter medications:  2nd Generation antihistamines with a decongestant such as:   Claritin-D (Loratadine with Pseudoephedrine) or  Zyrtec-D (Cetirizine with Pseudoephedrine) or   Allegra-D (Fexofenadine with Pseudoephedrine) or   Decongestant alone: Pseudoephedrine 60mg PO every 4-6 hours for nasal congestion. Max 240mg in 24 hour period  Claritin or Zyrtec plus -Coricidin HBP (Safe for when you have high blood pressure)  Nasal corticosteroid: examples are Flonase or Nasacort.  Nasal saline irrigation  Humidified air  Warm moist air such as a hot cup of water in a mug, sit at the dining room table with the mug on the table, put a towel over your head to cover over the mug and breath in the warm steam (don't drink the fluid in case you have mucus that drips in).  Vicks Vapor Rub  Over the counter Mucinex and increase you fluid intake.  For Cough or sore throat:  Salt water gurgle  Teaspoon of Honey up to 3x/day  Chloraseptic spray  Throat lozenges  Over the Counter Tylenol or Ibuprofen  Dextromethorphan 30mg PO every 6-8 hours for cough. max 120 mg in 24 hour period.  I have sent Fluconazole in case you develop yeast infection from the antibiotics.    Follow up with Primary Care Provider in 3-5 days if not improving.  Proceed to Emergency  Department if symptoms worsen.    If tests have been performed at [facility], our office will contact you with results if changes need to be made to the care plan discussed with you at the visit.  You can review your full results on [facility].    Chief Complaint     Chief Complaint   Patient presents with   • Nasal Congestion     Patient c/o nasal congestion with ear fullness x 5 days          History of Present Illness       Nasal congestion and right ear pain starting about 5 days ago.  Has not taken Claritin-D and using Flonase without relief.        Review of Systems   Review of Systems   Constitutional:  Negative for chills and fever.   HENT:  Positive for congestion, ear pain, postnasal drip, rhinorrhea and sinus pressure. Negative for sinus pain and sore throat.    Respiratory:  Negative for cough and shortness of breath.    Neurological:  Negative for dizziness and headaches.         Current Medications       Current Outpatient Medications:   •  amoxicillin-clavulanate (AUGMENTIN) 875-125 mg per tablet, Take 1 tablet by mouth every 12 (twelve) hours for 7 days, Disp: 14 tablet, Rfl: 0  •  fluconazole (DIFLUCAN) 150 mg tablet, Take 1 tablet (150 mg total) by mouth daily for 2 doses May take 2nd dose if still symptomatic after 72 hours from first dose., Disp: 2 tablet, Rfl: 0  •  ALPRAZolam (XANAX) 0.25 mg tablet, Take 1 tablet (0.25 mg total) by mouth as needed for anxiety, Disp: 30 tablet, Rfl: 0  •  fluticasone (FLONASE) 50 mcg/act nasal spray, 1 spray into each nostril daily, Disp: 15.8 mL, Rfl: 3  •  hydrocortisone (WESTCORT) 0.2 % cream, Apply topically 2 (two) times a day, Disp: 45 g, Rfl: 0  •  loratadine (CLARITIN) 10 mg tablet, Take 1 tablet (10 mg total) by mouth daily, Disp: 30 tablet, Rfl: 5  •  methylPREDNISolone 4 MG tablet therapy pack, Use as directed on package (Patient not taking: Reported on 9/24/2024), Disp: 21 each, Rfl: 0    Current Allergies     Allergies as of 12/14/2024 -  Reviewed 12/14/2024   Allergen Reaction Noted   • Metronidazole  11/20/2018            The following portions of the patient's history were reviewed and updated as appropriate: allergies, current medications, past family history, past medical history, past social history, past surgical history and problem list.     Past Medical History:   Diagnosis Date   • Chlamydia     treated   • SUAD (generalized anxiety disorder)    • Rectal bleed     happening for about a year   • Varicella        Past Surgical History:   Procedure Laterality Date   • MN COLONOSCOPY FLX DX W/COLLJ SPEC WHEN PFRMD N/A 12/4/2018    Procedure: COLONOSCOPY with polypectomy;  Surgeon: Mallory Orellana MD;  Location: [facility];  Service: General   • TOOTH EXTRACTION         Family History   Problem Relation Age of Onset   • Asthma Brother    • Diabetes Maternal Grandmother    • Hypertension Maternal Grandmother    • Breast cancer Maternal Grandmother    • Prostate cancer Maternal Grandfather    • Diabetes Paternal Grandfather    • Colon cancer Neg Hx    • Ovarian cancer Neg Hx    • Uterine cancer Neg Hx          Medications have been verified.        Objective   /88   Pulse (!) 110   Temp 98.4 °F (36.9 °C)   Resp 18   SpO2 100%   No LMP recorded.       Physical Exam     Physical Exam  Vitals and nursing note reviewed.   Constitutional:       Appearance: Normal appearance.   HENT:      Head: Normocephalic and atraumatic.      Right Ear: Tympanic membrane is erythematous and bulging.      Left Ear: A middle ear effusion is present.      Nose: Congestion and rhinorrhea present. Rhinorrhea is purulent.      Right Sinus: Maxillary sinus tenderness present. No frontal sinus tenderness.      Left Sinus: Maxillary sinus tenderness present. No frontal sinus tenderness.   Pulmonary:      Effort: Pulmonary effort is normal.   Skin:     General: Skin is warm and dry.      Capillary Refill: Capillary refill takes less than 2 seconds.   Neurological:       General: No focal deficit present.      Mental Status: She is alert and oriented to person, place, and time. Mental status is at baseline.      Sensory: No sensory deficit.      Motor: No weakness.   Psychiatric:         Mood and Affect: Mood normal.         Behavior: Behavior normal.         Thought Content: Thought content normal.

## (undated) DEVICE — SINGLE-USE BIOPSY FORCEPS: Brand: RADIAL JAW 4